# Patient Record
Sex: MALE | Race: WHITE | Employment: UNEMPLOYED | URBAN - METROPOLITAN AREA
[De-identification: names, ages, dates, MRNs, and addresses within clinical notes are randomized per-mention and may not be internally consistent; named-entity substitution may affect disease eponyms.]

---

## 2019-04-22 ENCOUNTER — APPOINTMENT (OUTPATIENT)
Dept: ULTRASOUND IMAGING | Age: 24
End: 2019-04-22

## 2019-04-22 ENCOUNTER — HOSPITAL ENCOUNTER (EMERGENCY)
Age: 24
Discharge: HOME | End: 2019-04-23
Attending: EMERGENCY MEDICINE

## 2019-04-22 VITALS
OXYGEN SATURATION: 96 % | BODY MASS INDEX: 26.6 KG/M2 | WEIGHT: 190 LBS | SYSTOLIC BLOOD PRESSURE: 142 MMHG | TEMPERATURE: 98.4 F | RESPIRATION RATE: 18 BRPM | HEIGHT: 71 IN | HEART RATE: 110 BPM | DIASTOLIC BLOOD PRESSURE: 96 MMHG

## 2019-04-22 DIAGNOSIS — L73.9 FOLLICULITIS: Primary | ICD-10-CM

## 2019-04-22 DIAGNOSIS — N50.812 LEFT TESTICULAR PAIN: ICD-10-CM

## 2019-04-22 DIAGNOSIS — K59.00 CONSTIPATION, UNSPECIFIED CONSTIPATION TYPE: ICD-10-CM

## 2019-04-22 DIAGNOSIS — R10.9 ABDOMINAL PAIN, UNSPECIFIED ABDOMINAL LOCATION: ICD-10-CM

## 2019-04-22 LAB
ALBUMIN SERPL-MCNC: 4.4 GM/DL (ref 3.4–5)
ALP BLD-CCNC: 49 IU/L (ref 40–128)
ALT SERPL-CCNC: 26 U/L (ref 10–40)
ANION GAP SERPL CALCULATED.3IONS-SCNC: 13 MMOL/L (ref 4–16)
AST SERPL-CCNC: 38 IU/L (ref 15–37)
BACTERIA: NEGATIVE /HPF
BASOPHILS ABSOLUTE: 0.1 K/CU MM
BASOPHILS RELATIVE PERCENT: 0.6 % (ref 0–1)
BILIRUB SERPL-MCNC: 0.9 MG/DL (ref 0–1)
BILIRUBIN URINE: NEGATIVE MG/DL
BLOOD, URINE: NEGATIVE
BUN BLDV-MCNC: 11 MG/DL (ref 6–23)
CALCIUM SERPL-MCNC: 9.5 MG/DL (ref 8.3–10.6)
CHLORIDE BLD-SCNC: 94 MMOL/L (ref 99–110)
CLARITY: CLEAR
CO2: 25 MMOL/L (ref 21–32)
COLOR: YELLOW
CREAT SERPL-MCNC: 1 MG/DL (ref 0.9–1.3)
DIFFERENTIAL TYPE: ABNORMAL
EOSINOPHILS ABSOLUTE: 0.2 K/CU MM
EOSINOPHILS RELATIVE PERCENT: 2.4 % (ref 0–3)
GFR AFRICAN AMERICAN: >60 ML/MIN/1.73M2
GFR NON-AFRICAN AMERICAN: >60 ML/MIN/1.73M2
GLUCOSE BLD-MCNC: 98 MG/DL (ref 70–99)
GLUCOSE, URINE: NEGATIVE MG/DL
HCT VFR BLD CALC: 46.3 % (ref 42–52)
HEMOGLOBIN: 15.4 GM/DL (ref 13.5–18)
IMMATURE NEUTROPHIL %: 0.4 % (ref 0–0.43)
KETONES, URINE: ABNORMAL MG/DL
LEUKOCYTE ESTERASE, URINE: NEGATIVE
LIPASE: 15 IU/L (ref 13–60)
LYMPHOCYTES ABSOLUTE: 2.3 K/CU MM
LYMPHOCYTES RELATIVE PERCENT: 29.9 % (ref 24–44)
MCH RBC QN AUTO: 30.6 PG (ref 27–31)
MCHC RBC AUTO-ENTMCNC: 33.3 % (ref 32–36)
MCV RBC AUTO: 91.9 FL (ref 78–100)
MONOCYTES ABSOLUTE: 1.1 K/CU MM
MONOCYTES RELATIVE PERCENT: 14.1 % (ref 0–4)
NITRITE URINE, QUANTITATIVE: NEGATIVE
NUCLEATED RBC %: 0 %
PDW BLD-RTO: 13.2 % (ref 11.7–14.9)
PH, URINE: 6 (ref 5–8)
PLATELET # BLD: 242 K/CU MM (ref 140–440)
PMV BLD AUTO: 9.5 FL (ref 7.5–11.1)
POTASSIUM SERPL-SCNC: 3.2 MMOL/L (ref 3.5–5.1)
PROTEIN UA: NEGATIVE MG/DL
RBC # BLD: 5.04 M/CU MM (ref 4.6–6.2)
RBC URINE: 1 /HPF (ref 0–3)
SEGMENTED NEUTROPHILS ABSOLUTE COUNT: 4.1 K/CU MM
SEGMENTED NEUTROPHILS RELATIVE PERCENT: 52.6 % (ref 36–66)
SODIUM BLD-SCNC: 132 MMOL/L (ref 135–145)
SPECIFIC GRAVITY UA: 1.01 (ref 1–1.03)
TOTAL IMMATURE NEUTOROPHIL: 0.03 K/CU MM
TOTAL NUCLEATED RBC: 0 K/CU MM
TOTAL PROTEIN: 7.2 GM/DL (ref 6.4–8.2)
TRICHOMONAS: ABNORMAL /HPF
UROBILINOGEN, URINE: NORMAL MG/DL (ref 0.2–1)
WBC # BLD: 7.8 K/CU MM (ref 4–10.5)
WBC UA: <1 /HPF (ref 0–2)

## 2019-04-22 PROCEDURE — 80053 COMPREHEN METABOLIC PANEL: CPT

## 2019-04-22 PROCEDURE — 80307 DRUG TEST PRSMV CHEM ANLYZR: CPT

## 2019-04-22 PROCEDURE — 81001 URINALYSIS AUTO W/SCOPE: CPT

## 2019-04-22 PROCEDURE — 93975 VASCULAR STUDY: CPT

## 2019-04-22 PROCEDURE — 87491 CHLMYD TRACH DNA AMP PROBE: CPT

## 2019-04-22 PROCEDURE — 87591 N.GONORRHOEAE DNA AMP PROB: CPT

## 2019-04-22 PROCEDURE — 76870 US EXAM SCROTUM: CPT

## 2019-04-22 PROCEDURE — 4500000027

## 2019-04-22 PROCEDURE — 85025 COMPLETE CBC W/AUTO DIFF WBC: CPT

## 2019-04-22 PROCEDURE — 36415 COLL VENOUS BLD VENIPUNCTURE: CPT

## 2019-04-22 PROCEDURE — 83690 ASSAY OF LIPASE: CPT

## 2019-04-22 PROCEDURE — 99284 EMERGENCY DEPT VISIT MOD MDM: CPT

## 2019-04-22 RX ORDER — FLUOXETINE HYDROCHLORIDE 20 MG/1
40 CAPSULE ORAL DAILY
COMMUNITY
End: 2020-05-31

## 2019-04-22 RX ORDER — HYDROXYZINE HYDROCHLORIDE 10 MG/1
10 TABLET, FILM COATED ORAL 3 TIMES DAILY PRN
COMMUNITY
End: 2020-05-31

## 2019-04-22 ASSESSMENT — PAIN DESCRIPTION - PAIN TYPE: TYPE: ACUTE PAIN

## 2019-04-22 ASSESSMENT — PAIN DESCRIPTION - DESCRIPTORS: DESCRIPTORS: SHARP

## 2019-04-22 ASSESSMENT — PAIN DESCRIPTION - LOCATION: LOCATION: ABDOMEN

## 2019-04-22 ASSESSMENT — PAIN SCALES - GENERAL: PAINLEVEL_OUTOF10: 9

## 2019-04-23 LAB
AMPHETAMINES: ABNORMAL
BARBITURATE SCREEN URINE: NEGATIVE
BENZODIAZEPINE SCREEN, URINE: NEGATIVE
CANNABINOID SCREEN URINE: ABNORMAL
COCAINE METABOLITE: NEGATIVE
OPIATES, URINE: NEGATIVE
OXYCODONE: ABNORMAL
PHENCYCLIDINE, URINE: NEGATIVE

## 2019-04-23 RX ORDER — DOXYCYCLINE HYCLATE 100 MG
100 TABLET ORAL 2 TIMES DAILY
Qty: 14 TABLET | Refills: 0 | Status: SHIPPED | OUTPATIENT
Start: 2019-04-23 | End: 2019-04-30

## 2019-04-23 NOTE — ED PROVIDER NOTES
eMERGENCY dEPARTMENT eNCOUnter      PCP: No primary care provider on file. CHIEF COMPLAINT    Chief Complaint   Patient presents with    Abdominal Pain       HPI    Nessa Bautista is a 24 y.o. male who presents with left sided scrotal pain. Onset was yesterday. Context is patient reports he drank a bottle of Robitussin 4 days ago to [employer] high\" but reports he was unable to urinate or have a bowel movement for many hours after drinking Robitussin. Patient reports he also tried meth for the first time 3 days ago and smoked marijuana. Patient reports he has been urinating today and had a BM earlier today. Patient reports his left side of scrotum has been having intermittent sharp pains. Pain does radiate throughout all of his abdomen and he reports his pain is sharp as well. No aggravating or alleviating factors. Patient also reports a rash in genital region. Patient reports that when he was having a bowel movement today he was \"bleeding from my balls. \"    Patient denies fever, chills, nausea, vomiting, diarrhea, melena, hematochezia, penile pain, penile discharge, right-sided testicular pain, concern for sexually transmitted infections, dysuria, urinary urgency, urinary frequency, hematuria. REVIEW OF SYSTEMS    Constitutional:  Denies fever, chills  HENT:  Denies sore throat or ear pain   Cardiovascular:  Denies chest pain, palpitations or swelling   Respiratory:  Denies cough or shortness of breath   GI:  See HPI above  : See HPI   Musculoskeletal:  Denies back pain or groin pain or masses. No pain or swelling of extremities. Skin:  + rash  Neurologic:  Denies headache, focal weakness or sensory changes   Endocrine:  Denies polyuria or polydypsia   Lymphatic:  Denies swollen glands     All other review of systems are negative  See HPI and nursing notes for additional information     [address]    History reviewed. No pertinent past medical history. History reviewed.  No pertinent surgical history. CURRENT MEDICATIONS    Current Outpatient Rx   Medication Sig Dispense Refill    doxycycline hyclate (VIBRA-TABS) 100 MG tablet Take 1 tablet by mouth 2 times daily for 7 days 14 tablet 0    FLUoxetine (PROZAC) 20 MG capsule Take 40 mg by mouth daily      hydrOXYzine (ATARAX) 10 MG tablet Take 10 mg by mouth 3 times daily as needed for Itching         ALLERGIES    Allergies no known allergies    SOCIAL AND FAMILY HISTORY    Social History     Socioeconomic History    Marital status: Single     Spouse name: None    Number of children: None    Years of education: None    Highest education level: None   Occupational History    None   Social Needs    Financial resource strain: None    Food insecurity:     Worry: None     Inability: None    Transportation needs:     Medical: None     Non-medical: None   Tobacco Use    Smoking status: Current Every Day Smoker     Packs/day: 0.50     Types: Cigarettes    Smokeless tobacco: Never Used   Substance and Sexual Activity    Alcohol use: Yes    Drug use: Yes     Types: Marijuana, Methamphetamines    Sexual activity: None   Lifestyle    Physical activity:     Days per week: None     Minutes per session: None    Stress: None   Relationships    Social connections:     Talks on phone: None     Gets together: None     Attends [employer] service: None     Active member of club or organization: None     Attends meetings of clubs or organizations: None     Relationship status: None    Intimate partner violence:     Fear of current or ex partner: None     Emotionally abused: None     Physically abused: None     Forced sexual activity: None   Other Topics Concern    None   Social History Narrative    None     History reviewed. No pertinent family history.     PHYSICAL EXAM    VITAL SIGNS: BP (!) 142/96   Pulse 110   Temp 98.4 °F (36.9 °C) (Oral)   Resp 18   Ht 5' 11\" (1.803 m)   Wt 190 lb (86.2 kg)   SpO2 96%   BMI 26.50 kg/m² Constitutional:  Well developed, well nourished. No distress  Eyes:  Sclera nonicteric, conjunctiva moist  HENT:  Atraumatic. PERRL. EOMI. Moist mucus membranes. Neck/Lymphatics: supple, no JVD, no swollen nodes  Respiratory:  No retractions, no accessory muscle use, normal breath sounds   Cardiovascular:  normal rate, normal rhythm, no murmurs    GI:    No gross discoloration.       -no [place]'s sign (periumbilical ecchymosis)       -no Grey-[place]'s sign (flank ecchymosis)    Bowel sounds present, no audible bruits. Soft, no distention, no guarding, no rigidity,   NO abdominal tenderness, no rebound tenderness, no palpable pulsatile masses,   No McBurney's point tenderness   Negative Rovsing sign    Negative Danielle's sign. Back:   No CVA tenderness to percussion. Musculoskeletal:  No edema, no deformity  Vascular: radial and DP pulses 2+ equal bilaterally  Integument: Dry skin. Genital region reveals mild erythema at base of hair follicles but no convalescent erythema. No streaking erythema. No induration or fluctuance. No bullae, vesicles, ecchymosis, abrasions, lacerations. There is no active bleeding present. Neurologic:  Alert & oriented, normal speech  Psychiatric: Cooperative, pleasant affect   GENITOURINARY: Male chaperone Steph Garcia RN present. Penile lesions are absent. There is no urethral discharge or bleeding. There is no penile erythema, edema, or deformity. There is no scrotal erythema, edema, masses, or function but there is some mild left-sided testicular tenderness to palpation, but no right-sided testicular tenderness to palpation. Inguinal hernias are absent. Perineal crepitus, ecchymoses, erythema, and masses are absent.        ______________________________________________________________________    Procedures:  Perirectal Exam  Male nurse, Steph Garcia, present.   Rectal exam was performed by me:  - External inspection reveals no external hemorrhoids, masses, signs of abscess, fissures.   ______________________________________________________________________      LABS:  Results for orders placed or performed during the hospital encounter of 04/22/19   CBC Auto Differential   Result Value Ref Range    WBC 7.8 4.0 - 10.5 K/CU MM    RBC 5.04 4.6 - 6.2 M/CU MM    Hemoglobin 15.4 13.5 - 18.0 GM/DL    Hematocrit 46.3 42 - 52 %    MCV 91.9 78 - 100 FL    MCH 30.6 27 - 31 PG    MCHC 33.3 32.0 - 36.0 %    RDW 13.2 11.7 - 14.9 %    Platelets 639 037 - 504 K/CU MM    MPV 9.5 7.5 - 11.1 FL    Differential Type AUTOMATED DIFFERENTIAL     Segs Relative 52.6 36 - 66 %    Lymphocytes % 29.9 24 - 44 %    Monocytes % 14.1 (H) 0 - 4 %    Eosinophils % 2.4 0 - 3 %    Basophils % 0.6 0 - 1 %    Segs Absolute 4.1 K/CU MM    Lymphocytes # 2.3 K/CU MM    Monocytes # 1.1 K/CU MM    Eosinophils # 0.2 K/CU MM    Basophils # 0.1 K/CU MM    Nucleated RBC % 0.0 %    Total Nucleated RBC 0.0 K/CU MM    Total Immature Neutrophil 0.03 K/CU MM    Immature Neutrophil % 0.4 0 - 0.43 %   Comprehensive Metabolic Panel   Result Value Ref Range    Sodium 132 (L) 135 - 145 MMOL/L    Potassium 3.2 (L) 3.5 - 5.1 MMOL/L    Chloride 94 (L) 99 - 110 mMol/L    CO2 25 21 - 32 MMOL/L    BUN 11 6 - 23 MG/DL    CREATININE 1.0 0.9 - 1.3 MG/DL    Glucose 98 70 - 99 MG/DL    Calcium 9.5 8.3 - 10.6 MG/DL    Alb 4.4 3.4 - 5.0 GM/DL    Total Protein 7.2 6.4 - 8.2 GM/DL    Total Bilirubin 0.9 0.0 - 1.0 MG/DL    ALT 26 10 - 40 U/L    AST 38 (H) 15 - 37 IU/L    Alkaline Phosphatase 49 40 - 128 IU/L    GFR Non-African American >60 >60 mL/min/1.73m2    GFR African American >60 >60 mL/min/1.73m2    Anion Gap 13 4 - 16   Lipase   Result Value Ref Range    Lipase 15 13 - 60 IU/L   Urinalysis   Result Value Ref Range    Color, UA YELLOW YELLOW    Clarity, UA CLEAR CLEAR    Glucose, Urine NEGATIVE NEGATIVE MG/DL    Bilirubin Urine NEGATIVE NEGATIVE MG/DL    Ketones, Urine MODERATE (A) NEGATIVE MG/DL    Specific Gravity, UA 1.008 1.001 - 1.035    Blood, Urine NEGATIVE NEGATIVE    pH, Urine 6.0 5.0 - 8.0    Protein, UA NEGATIVE NEGATIVE MG/DL    Urobilinogen, Urine NORMAL 0.2 - 1.0 MG/DL    Nitrite Urine, Quantitative NEGATIVE NEGATIVE    Leukocyte Esterase, Urine NEGATIVE NEGATIVE    RBC, UA 1 0 - 3 /HPF    WBC, UA <1 0 - 2 /HPF    Bacteria, UA NEGATIVE NEGATIVE /HPF    Trichomonas, UA NONE SEEN NONE SEEN /HPF   Urine Drug Screen   Result Value Ref Range    Cannabinoid Scrn, Ur UNCONFIRMED POSITIVE (A) NEGATIVE    Amphetamines UNCONFIRMED POSITIVE (A) NEGATIVE    Cocaine Metabolite NEGATIVE NEGATIVE    Benzodiazepine Screen, Urine NEGATIVE NEGATIVE    Barbiturate Screen, Ur NEGATIVE NEGATIVE    Opiates, Urine NEGATIVE NEGATIVE    Phencyclidine, Urine NEGATIVE NEGATIVE    Oxycodone  NEGATIVE     NEGATIVE          THRESHOLD CONCENTRATIONS (mg/dL)  AMPHT               1000  SIVAN,OPIA             300  BZO,BAR              200  PCP                   25  THC                   50  OXY                  100          IF POSITIVE, SPECIMEN WILL BE  DISCARDED AFTER 6 MONTHS. CALL LAB IF CONFIRMATION NEEDED. ALL NEGATIVE SPECIMENS WILL BE  DISCARDED AFTER ONE WEEK. * UNCONFIRMED POSITIVES MAY  NOT MEET FORENSIC REQUIREMENTS. RADIOLOGY/PROCEDURES    US SCROTUM AND TESTICLES   Final Result   Unremarkable testicular ultrasound with normal Doppler flow. US DUP ABD PEL RETRO SCROT COMPLETE   Final Result   Unremarkable testicular ultrasound with normal Doppler flow. ED COURSE & MEDICAL DECISION MAKING       Vital signs and nursing notes reviewed during ED course. Patient care and presentation staffed with and seen in conjunction with supervising physician, Dr. Trina Smith, today. Patient presents as above which prompted workup. While in the ED today, labs and imaging were obtained. Labs reveal unconfirmed positives for amphetamines and cannabinoids and urine drug screen but labs are otherwise without clinically significant derangements.  [address] of scrotum and testicles obtained today and is reassuring. No evidence of testicular torsion. Patient has been able to urinate several times in the ED. Suspect patient's initial constipation and urinary retention were secondary to Robitussin ingestion. Abdominal exam without peritoneal signs here. Physical exam remarkable for folliculitis of the genital region but no evidence of abscess present. Do not suspect Tin's gangrene. There is no evidence of an area that was bleeding despite patient reporting that he had bleeding earlier today. Patient is nontoxic appearing. Vital signs are stable- patient was tachycardia one point in the ED, but his heart rate was normal during my examination. Patient stable for outpatient management. Patient discharged home with prescription for doxycycline-we discussed medication. Advised patient to refrain from drug usage and we discussed drug abuse programs in the local area. Patient declines referral to drug abuse resources. All pertinent Lab data and radiographic results reviewed with patient at bedside. The patient and/or the family were informed of the results of any tests/labs/imaging, the treatment plan, and time was allotted to answer questions. Diagnosis, disposition, and treatment plan reviewed with patient and/or family who understands and agrees. Patient understands and agrees to follow up with walk in clinic for recheck in 2 days and PCP referral provided today. Patient understands and agrees to return to the emergency department for any new or worsening symptoms- strict return precautions given. Clinical  IMPRESSION    1. Folliculitis    2. Constipation, unspecified constipation type    3. Left testicular pain    4.  Abdominal pain, unspecified abdominal location        Disposition referral (if applicable):  [facility]  [address] [address] [facility]  [phone]  Call today  [address] in 2 days    April Valles Jacob Carlton MD  [address] [place] [address]  [phone]    Call today  Establish primary care physician    [facility] Emergency Department  [address] [facility] [address] [phone]  Go to   Return to ED if symptoms worsen or new symptoms      Disposition medications (if applicable):  Discharge Medication List as of 4/23/2019 12:24 AM      START taking these medications    Details   doxycycline hyclate (VIBRA-TABS) 100 MG tablet Take 1 tablet by mouth 2 times daily for 7 days, Disp-14 tablet, R-0Print               Comment: Please note this report has been produced using speech recognition software and may contain errors related to that system including errors in grammar, punctuation, and spelling, as well as words and phrases that may be inappropriate. If there are any questions or concerns please feel free to contact the dictating provider for clarification.        Zach Monique PA-C  04/24/19 6098

## 2019-04-23 NOTE — ED NOTES
Patient at [address].      Dasia Felton RN  04/22/19 3423

## 2019-04-23 NOTE — ED PROVIDER NOTES
I independently examined and evaluated Delfina Melendez. In brief, 23 yo M who presents with scrotal pain. Reportedly drank a bottle of robitussin Friday in an attempt to get high. Describes that he was unable to urinate or have BM initially. Last formed BM was earlier today. Has been able to urinate in ED several times. Focused exam revealed the patient appears well-hydrated, well-nourished. Mucous membranes are moist. Speech is clear. Breathing is unlabored. Abdomen is soft, nontender. Rectal exam is nontender. No hemorrhoids or areas of tenderness. No evidence of impaction. Patient does have evidence of folliculitis in the genital region. No areas of significant induration or fluctuance to suggest developing abscess. Skin is dry. Mental status is normal. The patient moves all extremities and is without facial droop. ED course: suspect initial constipation and urinary retention reported was secondary to Robitussin ingestion (depending on which formulation he ingested). His exam is unremarkable here. Labs and imaging also reassuring. Agree with plan for outpatient management. All diagnostic, treatment, and disposition decisions were made by myself in conjunction with the advanced practice provider. For all further details of the patient's emergency department visit, please see the advanced practice provider's documentation. Comment: Please note this report has been produced using speech recognition software and may contain errors related to that system including errors in grammar, punctuation, and spelling, as well as words and phrases that may be inappropriate. If there are any questions or concerns please feel free to contact the dictating provider for clarification.         Chela Celaya DO  04/23/19 0023

## 2019-04-23 NOTE — ED NOTES
Bladder scan: 311 ml. Notified Tatiana RAJPUT.  Patient attempting to urinate in urinal.     Ruiz Back, RN  04/22/19 7585

## 2019-04-23 NOTE — ED NOTES
Kathryn RAJPUT notified of patient's request for CT and updated on dysuria complaints. No further orders at this time. Awaiting urine results.      Francie Viramontes RN  04/22/19 9509

## 2019-04-23 NOTE — ED NOTES
Bed: H-03  Expected date:   Expected time:   Means of arrival:   Comments:  [facility]  04/22/19 7318

## 2019-04-25 LAB
CHLAMYDIA TRACHOMATIS AMPLIFIED DET: NEGATIVE
CHLAMYDIA TRACHOMATIS AMPLIFIED DET: NORMAL
N GONORRHOEAE AMPLIFIED DET: NEGATIVE
N GONORRHOEAE AMPLIFIED DET: NORMAL

## 2019-04-28 ENCOUNTER — HOSPITAL ENCOUNTER (EMERGENCY)
Age: 24
Discharge: HOME | End: 2019-04-29
Attending: EMERGENCY MEDICINE

## 2019-04-28 VITALS
OXYGEN SATURATION: 99 % | BODY MASS INDEX: 25.9 KG/M2 | HEART RATE: 101 BPM | HEIGHT: 71 IN | TEMPERATURE: 98.1 F | RESPIRATION RATE: 16 BRPM | WEIGHT: 185 LBS | SYSTOLIC BLOOD PRESSURE: 139 MMHG | DIASTOLIC BLOOD PRESSURE: 85 MMHG

## 2019-04-28 DIAGNOSIS — F19.10 POLYSUBSTANCE ABUSE (HCC): Primary | ICD-10-CM

## 2019-04-28 LAB
BASOPHILS ABSOLUTE: 0.1 K/CU MM
BASOPHILS RELATIVE PERCENT: 0.8 % (ref 0–1)
DIFFERENTIAL TYPE: ABNORMAL
EOSINOPHILS ABSOLUTE: 0.1 K/CU MM
EOSINOPHILS RELATIVE PERCENT: 1.1 % (ref 0–3)
HCT VFR BLD CALC: 45.4 % (ref 42–52)
HEMOGLOBIN: 15.5 GM/DL (ref 13.5–18)
IMMATURE NEUTROPHIL %: 0.5 % (ref 0–0.43)
LYMPHOCYTES ABSOLUTE: 2.5 K/CU MM
LYMPHOCYTES RELATIVE PERCENT: 24.8 % (ref 24–44)
MCH RBC QN AUTO: 30.5 PG (ref 27–31)
MCHC RBC AUTO-ENTMCNC: 34.1 % (ref 32–36)
MCV RBC AUTO: 89.2 FL (ref 78–100)
MONOCYTES ABSOLUTE: 1.1 K/CU MM
MONOCYTES RELATIVE PERCENT: 11.2 % (ref 0–4)
NUCLEATED RBC %: 0 %
PDW BLD-RTO: 12.5 % (ref 11.7–14.9)
PLATELET # BLD: 274 K/CU MM (ref 140–440)
PMV BLD AUTO: 9.5 FL (ref 7.5–11.1)
RBC # BLD: 5.09 M/CU MM (ref 4.6–6.2)
SEGMENTED NEUTROPHILS ABSOLUTE COUNT: 6.2 K/CU MM
SEGMENTED NEUTROPHILS RELATIVE PERCENT: 61.6 % (ref 36–66)
TOTAL IMMATURE NEUTOROPHIL: 0.05 K/CU MM
TOTAL NUCLEATED RBC: 0 K/CU MM
WBC # BLD: 10.1 K/CU MM (ref 4–10.5)

## 2019-04-28 PROCEDURE — 84443 ASSAY THYROID STIM HORMONE: CPT

## 2019-04-28 PROCEDURE — 80053 COMPREHEN METABOLIC PANEL: CPT

## 2019-04-28 PROCEDURE — G0480 DRUG TEST DEF 1-7 CLASSES: HCPCS

## 2019-04-28 PROCEDURE — 85025 COMPLETE CBC W/AUTO DIFF WBC: CPT

## 2019-04-28 PROCEDURE — 99284 EMERGENCY DEPT VISIT MOD MDM: CPT

## 2019-04-28 ASSESSMENT — PAIN DESCRIPTION - LOCATION: LOCATION: BACK;THROAT

## 2019-04-29 LAB
ACETAMINOPHEN LEVEL: <5 UG/ML (ref 15–30)
ALBUMIN SERPL-MCNC: 4.4 GM/DL (ref 3.4–5)
ALCOHOL SCREEN SERUM: NORMAL %WT/VOL
ALP BLD-CCNC: 49 IU/L (ref 40–128)
ALT SERPL-CCNC: 15 U/L (ref 10–40)
AMPHETAMINES: ABNORMAL
ANION GAP SERPL CALCULATED.3IONS-SCNC: 17 MMOL/L (ref 4–16)
AST SERPL-CCNC: 24 IU/L (ref 15–37)
BARBITURATE SCREEN URINE: NEGATIVE
BENZODIAZEPINE SCREEN, URINE: NEGATIVE
BILIRUB SERPL-MCNC: 0.7 MG/DL (ref 0–1)
BUN BLDV-MCNC: 9 MG/DL (ref 6–23)
CALCIUM SERPL-MCNC: 9.5 MG/DL (ref 8.3–10.6)
CANNABINOID SCREEN URINE: ABNORMAL
CHLORIDE BLD-SCNC: 93 MMOL/L (ref 99–110)
CO2: 24 MMOL/L (ref 21–32)
COCAINE METABOLITE: NEGATIVE
CREAT SERPL-MCNC: 0.9 MG/DL (ref 0.9–1.3)
DOSE AMOUNT: ABNORMAL
DOSE AMOUNT: ABNORMAL
DOSE TIME: ABNORMAL
DOSE TIME: ABNORMAL
GFR AFRICAN AMERICAN: >60 ML/MIN/1.73M2
GFR NON-AFRICAN AMERICAN: >60 ML/MIN/1.73M2
GLUCOSE BLD-MCNC: 87 MG/DL (ref 70–99)
OPIATES, URINE: NEGATIVE
OXYCODONE: ABNORMAL
PHENCYCLIDINE, URINE: NEGATIVE
POTASSIUM SERPL-SCNC: 3.8 MMOL/L (ref 3.5–5.1)
SALICYLATE LEVEL: <0.3 MG/DL (ref 15–30)
SODIUM BLD-SCNC: 134 MMOL/L (ref 135–145)
TOTAL PROTEIN: 7.1 GM/DL (ref 6.4–8.2)
TSH HIGH SENSITIVITY: 1.43 UIU/ML (ref 0.27–4.2)

## 2019-04-29 PROCEDURE — 80307 DRUG TEST PRSMV CHEM ANLYZR: CPT

## 2019-04-29 NOTE — ED NOTES
Pt ambulated to bathroom to provide urine sample at this time. Pt. Returned to bed and covered with blanket. Pt. Resting soundly.       Kinjal Santillan RN  04/29/19 8209

## 2019-04-29 NOTE — ED NOTES
Pt. Calm and cooperative at this time with sitter at bedside. Pt. Eyes closed, chest rise and fall present.       Reta Calero RN  04/29/19 8365

## 2019-04-29 NOTE — ED PROVIDER NOTES
eMERGENCY dEPARTMENT eNCOUnter      PCP: No primary care provider on file. CHIEF COMPLAINT    Chief Complaint   Patient presents with    Alcohol Intoxication    Addiction Problem    Suicidal       HPI    Yessenia Daly is a 24 y.o. male who presents for suicidal ideation as well as coming down from excessive drug use. Patient reports use cocaine and methamphetamine today. Reports that his been around a week since he has done any alcohol. He does report a couple days ago he did snort some heroin. Denies any intravenous drug use. Patient was staying with a friend until he had a falling out a couple days ago. Currently homeless. He reports that he has been doing meth constantly and has not slept much in the last 5 days. He denies homicidal ideations but does report that his been feeling like he does not want to exist anymore from a week to week and a half. He reports these are about shooting himself as well as jumping off a james. He denies acting on any suicidal thoughts in the past.  States that he used to be on Prozac but this been off of this for some time. REVIEW OF SYSTEMS    Psychiatric: See HPI. No Auditory/visual hallucinations  General: No fevers or chills  Cardiac:  No chest pain or palpitations  Respiratory: No difficulty breathing or new cough  Neurologic: No Headache or syncope  GI: No vomiting or diarrhea  : No dysuria or hematuria  See HPI for further details. All other systems reviewed and are negative. PAST MEDICAL & SURGICALHISTORY    No past medical history on file. No past surgical history on file.     CURRENT MEDICATIONS    Current Outpatient Rx   Medication Sig Dispense Refill    doxycycline hyclate (VIBRA-TABS) 100 MG tablet Take 1 tablet by mouth 2 times daily for 7 days 14 tablet 0    FLUoxetine (PROZAC) 20 MG capsule Take 40 mg by mouth daily      hydrOXYzine (ATARAX) 10 MG tablet Take 10 mg by mouth 3 times daily as needed for Itching         ALLERGIES    No Known Allergies    SOCIAL & FAMILYHISTORY    Social History     Socioeconomic History    Marital status: Single     Spouse name: Not on file    Number of children: Not on file    Years of education: Not on file    Highest education level: Not on file   Occupational History    Not on file   Social Needs    Financial resource strain: Not on file    Food insecurity:     Worry: Not on file     Inability: Not on file    Transportation needs:     Medical: Not on file     Non-medical: Not on file   Tobacco Use    Smoking status: Current Every Day Smoker     Packs/day: 0.50     Types: Cigarettes    Smokeless tobacco: Never Used   Substance and Sexual Activity    Alcohol use: Yes    Drug use: Yes     Types: Marijuana, Methamphetamines    Sexual activity: Not on file   Lifestyle    Physical activity:     Days per week: Not on file     Minutes per session: Not on file    Stress: Not on file   Relationships    Social connections:     Talks on phone: Not on file     Gets together: Not on file     Attends [employer] service: Not on file     Active member of club or organization: Not on file     Attends meetings of clubs or organizations: Not on file     Relationship status: Not on file    Intimate partner violence:     Fear of current or ex partner: Not on file     Emotionally abused: Not on file     Physically abused: Not on file     Forced sexual activity: Not on file   Other Topics Concern    Not on file   Social History Narrative    Not on file     No family history on file. PHYSICAL EXAM    VITAL SIGNS: /85   Pulse 101   Temp 98.1 °F (36.7 °C) (Oral)   Resp 16   Ht 5' 11\" (1.803 m)   Wt 185 lb (83.9 kg)   SpO2 99%   BMI 25.80 kg/m²   Constitutional:  Well developed, well nourished, no acute distress  Eyes:  EOMI. PERRL, conjunctiva normal   HENT:  Atraumatic, external ears normal, nose normal   Neck/Lymphatics: supple, no JVD, no swollen nodes.   No thyromegaly or thyroid nodules.   Respiratory:  No respiratory distress, normal breath sounds  Cardiovascular:  Normal rate, normal rhythm, no murmurs  GI:  Soft, nondistended, normal bowel sounds, nontender  Musculoskeletal:  No edema, no acute deformities   Integument:  Well hydrated   Neurologic:  Awake alert and oriented, no slurred speech, normal gross motor coordination and strength   Psychiatric:  Anxious, detached, calm, cooperative. + vague SI      LABS:  Results for orders placed or performed during the hospital encounter of 04/28/19   CBC w/ auto diff   Result Value Ref Range    WBC 10.1 4.0 - 10.5 K/CU MM    RBC 5.09 4.6 - 6.2 M/CU MM    Hemoglobin 15.5 13.5 - 18.0 GM/DL    Hematocrit 45.4 42 - 52 %    MCV 89.2 78 - 100 FL    MCH 30.5 27 - 31 PG    MCHC 34.1 32.0 - 36.0 %    RDW 12.5 11.7 - 14.9 %    Platelets 201 864 - 688 K/CU MM    MPV 9.5 7.5 - 11.1 FL    Differential Type AUTOMATED DIFFERENTIAL     Segs Relative 61.6 36 - 66 %    Lymphocytes % 24.8 24 - 44 %    Monocytes % 11.2 (H) 0 - 4 %    Eosinophils % 1.1 0 - 3 %    Basophils % 0.8 0 - 1 %    Segs Absolute 6.2 K/CU MM    Lymphocytes # 2.5 K/CU MM    Monocytes # 1.1 K/CU MM    Eosinophils # 0.1 K/CU MM    Basophils # 0.1 K/CU MM    Nucleated RBC % 0.0 %    Total Nucleated RBC 0.0 K/CU MM    Total Immature Neutrophil 0.05 K/CU MM    Immature Neutrophil % 0.5 (H) 0 - 0.43 %   CMP   Result Value Ref Range    Sodium 134 (L) 135 - 145 MMOL/L    Potassium 3.8 3.5 - 5.1 MMOL/L    Chloride 93 (L) 99 - 110 mMol/L    CO2 24 21 - 32 MMOL/L    BUN 9 6 - 23 MG/DL    CREATININE 0.9 0.9 - 1.3 MG/DL    Glucose 87 70 - 99 MG/DL    Calcium 9.5 8.3 - 10.6 MG/DL    Alb 4.4 3.4 - 5.0 GM/DL    Total Protein 7.1 6.4 - 8.2 GM/DL    Total Bilirubin 0.7 0.0 - 1.0 MG/DL    ALT 15 10 - 40 U/L    AST 24 15 - 37 IU/L    Alkaline Phosphatase 49 40 - 128 IU/L    GFR Non-African American >60 >60 mL/min/1.73m2    GFR African American >60 >60 mL/min/1.73m2    Anion Gap 17 (H) 4 - 16   ETOH Blood during ED course. Patient care and presentation staffed with supervising MD.   Patient seen by supervising MD today- see his/her note for details of the encounter. Consultation: Mental health Professional consulted in the emergency Department. See [facility] note for details of MH evaluation. Patient recheck reveals patient lying comfortable in exam bed. Patient denies suicidal thoughts or self-harm thoughts at this time. Patient understands the plan of  Patient understands he/she may return to ED at any time thoughts recur or any other general concerns. Differential diagnoses: Drug or alcohol use or abuse, psychosis, behavioral mental illness, metabolic disturbance, infection, neurologic emergency, other    Clinical  IMPRESSION    1. Polysubstance abuse ([facility] [address]       Diagnosis and plan discussed in detail with patient who understands and agrees. Return to emergency Department precautions were discussed in detail with patient, including worsening or any new symptoms, who understands and agre    Comment: Please note this report has been produced using speech recognition software and may contain errors related to that system including errors in grammar, punctuation, and spelling, as well as words and phrases that may be inappropriate. If there are any questions or concerns please feel free to contact the dictating provider for clarification.       Tamar Negron PA-C  04/29/19 8779

## 2019-04-29 NOTE — ED NOTES
Report received from [facility] 9 pt care assumed at this time.      Josh Stevenson, ENOCH  04/29/19 2156

## 2019-04-29 NOTE — ED NOTES
Patient resting in bed with sitter at bedside. Patient appears to be sleep however arouses easily. Patient appears to be under the influence of a unknown substance. Patient's eyes are glassy with hesitant speech. Patient admits to using meth earlier in the day. Patient reports he has a problem with using drugs. Patient's voice is slurred. Patient reports he is tired of using and wants help. Patient states he does not want to harm self or others. Patient denies alcohol use. Patient does admit to having thoughts of suicide after using drugs. Patient however states he is not suicidal and just wants some rest. Patient states \"I am tired\". Patient does not share any other information. Patient states he would go to [facility] for help for his  substance abuse. Patient then noted to close eyes and appear to be sleep. Patient was pleasant and mood was calm/sleepy. Patient denied homicidal ideation and did not answer question in reference to hallucinations, just shrugged shoulders. Patient would benefit from substance abuse treatment, has denied SI/HI. Will share with ED Physician for appropriate disposition.       Angelina Garvin, RN  04/29/19 [address] [facility],[address] [facility], RN  04/29/19 5824

## 2019-04-29 NOTE — ED NOTES
Pt resting quietly on bed with eyes closed, breathing is deep and even, no s/sx of pain or distress noted. 1:1 sitter with q15 min safety checks in place per protocol, will continue to monitor.           Mauro Love RN  04/29/19 4249

## 2019-04-29 NOTE — ED TRIAGE NOTES
States he is withdrawing from alcohol and meth, crack cocaine and pot. Patient states he has exhausted all of his support. Patient states he wishes he didn't wake up and has had thoughts of wanting to kill himself. Patient is anxious.

## 2019-04-29 NOTE — ED PROVIDER NOTES
Emergency [address] EMERGENCY DEPARTMENT    Patient: Rama Loving  MRN: [id number]  : 1995  Date of Evaluation: 2019  ED Supervising Physician: Giovani Lou MD    I independently examined and evaluated Rama Loving. In brief, Rama Loving is a 24 y.o. male that presents to the emergency department for help with his polysubstance abuse that includes meth, crack, heroin, marijuana and alcohol. Also states that he has had some suicidal thoughts but does not think that he would go through with anything. Focused exam: Well-appearing patient in no acute distress. Cardiac exam reveals normal heart rate and rhythm without any murmurs. Lungs sounds are clear bilaterally with no increased work of breathing. Abdomen is soft, nontender, nondistended. Depressed mood. Flat affect. Brief ED course/MDM: Patient presents with polysubstance abuse, suicidal thoughts. Mental health consultant for further evaluation. No acute medical issue at this time. After evaluation, felt that this is mostly a substance abuse issue and the patient is not an immediate harm to self and can seek care at [facility]. Patient agreeable with this plan of care and discharged in good condition. All diagnostic, treatment, and disposition decisions were made by myself in conjunction with the [facility]. For all further details of the patient's emergency department visit, please see their documentation.     (Please note that portions of this note may have been completed with a voice recognition program. Efforts were made to edit the dictations but occasionally words are mis-transcribed.)    MD Indio Damon MD  [phone]

## 2019-04-29 NOTE — ED NOTES
Pt asleep at this time laying on side. No pt. Needs or concerns.       Edouard Talbot RN  04/29/19 0751

## 2019-04-29 NOTE — ED NOTES
Bed: ED-23  Expected date:   Expected time:   Means of arrival:   Comments:  Tam bed zone 2     Tatyana Anderson RN  04/28/19 7685

## 2019-05-03 ENCOUNTER — HOSPITAL ENCOUNTER (OUTPATIENT)
Age: 24
Discharge: HOME | End: 2019-05-03
Payer: MEDICAID

## 2019-05-03 LAB
ALBUMIN SERPL-MCNC: 3.8 GM/DL (ref 3.4–5)
ALP BLD-CCNC: 45 IU/L (ref 40–129)
ALT SERPL-CCNC: 14 U/L (ref 10–40)
AST SERPL-CCNC: 15 IU/L (ref 15–37)
BILIRUB SERPL-MCNC: 0.2 MG/DL (ref 0–1)
BILIRUBIN DIRECT: 0.2 MG/DL (ref 0–0.3)
BILIRUBIN, INDIRECT: 0 MG/DL (ref 0–0.7)
HEPATITIS B SURFACE ANTIGEN: NON REACTIVE
HEPATITIS C ANTIBODY: NON REACTIVE
TOTAL PROTEIN: 5.9 GM/DL (ref 6.4–8.2)

## 2019-05-03 PROCEDURE — 86708 HEPATITIS A ANTIBODY: CPT

## 2019-05-03 PROCEDURE — 86803 HEPATITIS C AB TEST: CPT

## 2019-05-03 PROCEDURE — 87340 HEPATITIS B SURFACE AG IA: CPT

## 2019-05-03 PROCEDURE — 80076 HEPATIC FUNCTION PANEL: CPT

## 2019-05-03 PROCEDURE — 36415 COLL VENOUS BLD VENIPUNCTURE: CPT

## 2019-05-03 PROCEDURE — 87389 HIV-1 AG W/HIV-1&-2 AB AG IA: CPT

## 2019-05-04 LAB
HAV AB SERPL IA-ACNC: ABNORMAL
HAV AB SERPL IA-ACNC: POSITIVE

## 2019-05-05 LAB — HIV SCREEN: NON REACTIVE

## 2019-10-17 ENCOUNTER — HOSPITAL ENCOUNTER (EMERGENCY)
Age: 24
Discharge: HOME | End: 2019-10-17
Payer: MEDICAID

## 2019-10-17 VITALS
RESPIRATION RATE: 16 BRPM | WEIGHT: 180 LBS | HEIGHT: 71 IN | HEART RATE: 85 BPM | SYSTOLIC BLOOD PRESSURE: 162 MMHG | OXYGEN SATURATION: 98 % | DIASTOLIC BLOOD PRESSURE: 82 MMHG | BODY MASS INDEX: 25.2 KG/M2 | TEMPERATURE: 98.4 F

## 2019-10-17 DIAGNOSIS — K08.89 PAIN, DENTAL: Primary | ICD-10-CM

## 2019-10-17 PROCEDURE — 99282 EMERGENCY DEPT VISIT SF MDM: CPT

## 2019-10-17 RX ORDER — LIDOCAINE HYDROCHLORIDE 20 MG/ML
SOLUTION OROPHARYNGEAL
Qty: 120 ML | Refills: 0 | Status: SHIPPED | OUTPATIENT
Start: 2019-10-17 | End: 2020-05-31

## 2019-10-17 RX ORDER — IBUPROFEN 600 MG/1
600 TABLET ORAL EVERY 6 HOURS PRN
Qty: 30 TABLET | Refills: 0 | Status: SHIPPED | OUTPATIENT
Start: 2019-10-17 | End: 2019-11-19 | Stop reason: SDUPTHER

## 2019-10-17 RX ORDER — TRAMADOL HYDROCHLORIDE 50 MG/1
50 TABLET ORAL EVERY 6 HOURS PRN
Qty: 15 TABLET | Refills: 0 | Status: SHIPPED | OUTPATIENT
Start: 2019-10-17 | End: 2019-10-17 | Stop reason: CLARIF

## 2019-10-17 ASSESSMENT — PAIN DESCRIPTION - LOCATION: LOCATION: TEETH;JAW

## 2019-10-17 ASSESSMENT — PAIN DESCRIPTION - PAIN TYPE: TYPE: ACUTE PAIN

## 2019-10-17 ASSESSMENT — PAIN SCALES - GENERAL: PAINLEVEL_OUTOF10: 9

## 2019-10-17 ASSESSMENT — PAIN DESCRIPTION - ORIENTATION: ORIENTATION: UPPER;ANTERIOR

## 2019-11-09 ENCOUNTER — HOSPITAL ENCOUNTER (EMERGENCY)
Age: 24
Discharge: PSYCHIATRIC OR SUBSTANCE USE FACILITY | End: 2019-11-10
Attending: EMERGENCY MEDICINE
Payer: COMMERCIAL

## 2019-11-09 DIAGNOSIS — R45.851 SUICIDAL IDEATION: Primary | ICD-10-CM

## 2019-11-09 LAB
ACETAMINOPHEN LEVEL: <5 UG/ML (ref 15–30)
ALBUMIN SERPL-MCNC: 4.7 GM/DL (ref 3.4–5)
ALCOHOL SCREEN SERUM: 0.08 %WT/VOL
ALCOHOL SCREEN SERUM: 0.18 %WT/VOL
ALP BLD-CCNC: 54 IU/L (ref 40–128)
ALT SERPL-CCNC: 26 U/L (ref 10–40)
AMPHETAMINES: NEGATIVE
ANION GAP SERPL CALCULATED.3IONS-SCNC: 19 MMOL/L (ref 4–16)
AST SERPL-CCNC: 34 IU/L (ref 15–37)
BARBITURATE SCREEN URINE: NEGATIVE
BENZODIAZEPINE SCREEN, URINE: NEGATIVE
BILIRUB SERPL-MCNC: 0.3 MG/DL (ref 0–1)
BUN BLDV-MCNC: 11 MG/DL (ref 6–23)
CALCIUM SERPL-MCNC: 9 MG/DL (ref 8.3–10.6)
CANNABINOID SCREEN URINE: NEGATIVE
CHLORIDE BLD-SCNC: 98 MMOL/L (ref 99–110)
CO2: 23 MMOL/L (ref 21–32)
COCAINE METABOLITE: NEGATIVE
CREAT SERPL-MCNC: 1 MG/DL (ref 0.9–1.3)
DOSE AMOUNT: ABNORMAL
DOSE TIME: ABNORMAL
GFR AFRICAN AMERICAN: >60 ML/MIN/1.73M2
GFR NON-AFRICAN AMERICAN: >60 ML/MIN/1.73M2
GLUCOSE BLD-MCNC: 56 MG/DL (ref 70–99)
HCT VFR BLD CALC: 48.7 % (ref 42–52)
HEMOGLOBIN: 16.3 GM/DL (ref 13.5–18)
MCH RBC QN AUTO: 31.3 PG (ref 27–31)
MCHC RBC AUTO-ENTMCNC: 33.5 % (ref 32–36)
MCV RBC AUTO: 93.5 FL (ref 78–100)
OPIATES, URINE: NEGATIVE
OXYCODONE: NORMAL
PDW BLD-RTO: 12.2 % (ref 11.7–14.9)
PHENCYCLIDINE, URINE: NEGATIVE
PLATELET # BLD: 244 K/CU MM (ref 140–440)
PMV BLD AUTO: 9.5 FL (ref 7.5–11.1)
POTASSIUM SERPL-SCNC: 4.4 MMOL/L (ref 3.5–5.1)
RBC # BLD: 5.21 M/CU MM (ref 4.6–6.2)
SODIUM BLD-SCNC: 140 MMOL/L (ref 135–145)
TOTAL PROTEIN: 7.3 GM/DL (ref 6.4–8.2)
WBC # BLD: 17.7 K/CU MM (ref 4–10.5)

## 2019-11-09 PROCEDURE — 36415 COLL VENOUS BLD VENIPUNCTURE: CPT

## 2019-11-09 PROCEDURE — 99285 EMERGENCY DEPT VISIT HI MDM: CPT

## 2019-11-09 PROCEDURE — G0480 DRUG TEST DEF 1-7 CLASSES: HCPCS

## 2019-11-09 PROCEDURE — 80053 COMPREHEN METABOLIC PANEL: CPT

## 2019-11-09 PROCEDURE — 85027 COMPLETE CBC AUTOMATED: CPT

## 2019-11-09 PROCEDURE — 6370000000 HC RX 637 (ALT 250 FOR IP): Performed by: EMERGENCY MEDICINE

## 2019-11-09 PROCEDURE — 80307 DRUG TEST PRSMV CHEM ANLYZR: CPT

## 2019-11-09 RX ORDER — ONDANSETRON 4 MG/1
4 TABLET, ORALLY DISINTEGRATING ORAL ONCE
Status: COMPLETED | OUTPATIENT
Start: 2019-11-09 | End: 2019-11-09

## 2019-11-09 RX ADMIN — ONDANSETRON 4 MG: 4 TABLET, ORALLY DISINTEGRATING ORAL at 18:38

## 2019-11-10 VITALS
BODY MASS INDEX: 25.2 KG/M2 | TEMPERATURE: 98.1 F | SYSTOLIC BLOOD PRESSURE: 109 MMHG | OXYGEN SATURATION: 98 % | WEIGHT: 180 LBS | HEIGHT: 71 IN | RESPIRATION RATE: 16 BRPM | DIASTOLIC BLOOD PRESSURE: 55 MMHG | HEART RATE: 88 BPM

## 2019-11-10 PROCEDURE — 6370000000 HC RX 637 (ALT 250 FOR IP): Performed by: EMERGENCY MEDICINE

## 2019-11-10 RX ORDER — IBUPROFEN 600 MG/1
600 TABLET ORAL ONCE
Status: COMPLETED | OUTPATIENT
Start: 2019-11-10 | End: 2019-11-10

## 2019-11-10 RX ADMIN — IBUPROFEN 600 MG: 600 TABLET, FILM COATED ORAL at 06:20

## 2019-11-10 ASSESSMENT — PAIN DESCRIPTION - DESCRIPTORS: DESCRIPTORS: DISCOMFORT

## 2019-11-10 ASSESSMENT — PAIN DESCRIPTION - FREQUENCY: FREQUENCY: CONTINUOUS

## 2019-11-10 ASSESSMENT — PAIN SCALES - GENERAL
PAINLEVEL_OUTOF10: 8
PAINLEVEL_OUTOF10: 5

## 2019-11-10 ASSESSMENT — PAIN DESCRIPTION - LOCATION: LOCATION: THROAT

## 2019-11-10 ASSESSMENT — PAIN DESCRIPTION - PAIN TYPE: TYPE: ACUTE PAIN

## 2019-11-18 VITALS
WEIGHT: 180 LBS | BODY MASS INDEX: 25.2 KG/M2 | RESPIRATION RATE: 18 BRPM | OXYGEN SATURATION: 97 % | HEART RATE: 98 BPM | DIASTOLIC BLOOD PRESSURE: 92 MMHG | TEMPERATURE: 98.4 F | SYSTOLIC BLOOD PRESSURE: 123 MMHG | HEIGHT: 71 IN

## 2019-11-18 ASSESSMENT — PAIN SCALES - GENERAL: PAINLEVEL_OUTOF10: 6

## 2019-11-18 ASSESSMENT — PAIN DESCRIPTION - ORIENTATION: ORIENTATION: MID;LOWER

## 2019-11-18 ASSESSMENT — PAIN DESCRIPTION - LOCATION: LOCATION: BACK

## 2019-11-18 ASSESSMENT — PAIN DESCRIPTION - PAIN TYPE: TYPE: ACUTE PAIN

## 2019-11-18 ASSESSMENT — PAIN DESCRIPTION - DESCRIPTORS: DESCRIPTORS: THROBBING

## 2019-11-19 ENCOUNTER — HOSPITAL ENCOUNTER (EMERGENCY)
Age: 24
Discharge: HOME | End: 2019-11-19
Attending: EMERGENCY MEDICINE
Payer: COMMERCIAL

## 2019-11-19 DIAGNOSIS — S39.012A STRAIN OF LUMBAR REGION, INITIAL ENCOUNTER: Primary | ICD-10-CM

## 2019-11-19 PROCEDURE — 99283 EMERGENCY DEPT VISIT LOW MDM: CPT

## 2019-11-19 PROCEDURE — 6370000000 HC RX 637 (ALT 250 FOR IP): Performed by: EMERGENCY MEDICINE

## 2019-11-19 RX ORDER — LIDOCAINE 4 G/G
1 PATCH TOPICAL ONCE
Status: DISCONTINUED | OUTPATIENT
Start: 2019-11-19 | End: 2019-11-19 | Stop reason: HOSPADM

## 2019-11-19 RX ORDER — IBUPROFEN 600 MG/1
600 TABLET ORAL ONCE
Status: COMPLETED | OUTPATIENT
Start: 2019-11-19 | End: 2019-11-19

## 2019-11-19 RX ORDER — ACETAMINOPHEN 500 MG
1000 TABLET ORAL EVERY 6 HOURS PRN
Qty: 30 TABLET | Refills: 1 | Status: SHIPPED | OUTPATIENT
Start: 2019-11-19 | End: 2020-05-31

## 2019-11-19 RX ORDER — ACETAMINOPHEN 500 MG
1000 TABLET ORAL ONCE
Status: COMPLETED | OUTPATIENT
Start: 2019-11-19 | End: 2019-11-19

## 2019-11-19 RX ORDER — LIDOCAINE 50 MG/G
1 PATCH TOPICAL DAILY
Qty: 10 PATCH | Refills: 0 | Status: SHIPPED | OUTPATIENT
Start: 2019-11-19 | End: 2019-11-29

## 2019-11-19 RX ORDER — IBUPROFEN 600 MG/1
600 TABLET ORAL EVERY 6 HOURS PRN
Qty: 30 TABLET | Refills: 0 | Status: SHIPPED | OUTPATIENT
Start: 2019-11-19 | End: 2020-02-20 | Stop reason: ALTCHOICE

## 2019-11-19 RX ADMIN — ACETAMINOPHEN 1000 MG: 500 TABLET ORAL at 01:12

## 2019-11-19 RX ADMIN — IBUPROFEN 600 MG: 600 TABLET, FILM COATED ORAL at 01:12

## 2019-11-21 ASSESSMENT — ENCOUNTER SYMPTOMS
ABDOMINAL PAIN: 0
BACK PAIN: 1
COLOR CHANGE: 0
SHORTNESS OF BREATH: 0

## 2020-01-11 ENCOUNTER — HOSPITAL ENCOUNTER (EMERGENCY)
Age: 25
Discharge: HOME | End: 2020-01-11
Attending: EMERGENCY MEDICINE
Payer: COMMERCIAL

## 2020-01-11 VITALS
HEART RATE: 75 BPM | HEIGHT: 71 IN | OXYGEN SATURATION: 100 % | WEIGHT: 190 LBS | BODY MASS INDEX: 26.6 KG/M2 | RESPIRATION RATE: 16 BRPM | TEMPERATURE: 97.9 F | DIASTOLIC BLOOD PRESSURE: 75 MMHG | SYSTOLIC BLOOD PRESSURE: 129 MMHG

## 2020-01-11 LAB
ACETAMINOPHEN LEVEL: <5 UG/ML (ref 15–30)
ALBUMIN SERPL-MCNC: 4.3 GM/DL (ref 3.4–5)
ALCOHOL SCREEN SERUM: NORMAL %WT/VOL
ALP BLD-CCNC: 44 IU/L (ref 40–129)
ALT SERPL-CCNC: 31 U/L (ref 10–40)
AMPHETAMINES: ABNORMAL
ANION GAP SERPL CALCULATED.3IONS-SCNC: 12 MMOL/L (ref 4–16)
AST SERPL-CCNC: 20 IU/L (ref 15–37)
BARBITURATE SCREEN URINE: NEGATIVE
BENZODIAZEPINE SCREEN, URINE: NEGATIVE
BILIRUB SERPL-MCNC: 0.5 MG/DL (ref 0–1)
BUN BLDV-MCNC: 15 MG/DL (ref 6–23)
CALCIUM SERPL-MCNC: 9.1 MG/DL (ref 8.3–10.6)
CANNABINOID SCREEN URINE: ABNORMAL
CHLORIDE BLD-SCNC: 96 MMOL/L (ref 99–110)
CO2: 27 MMOL/L (ref 21–32)
COCAINE METABOLITE: NEGATIVE
CREAT SERPL-MCNC: 1 MG/DL (ref 0.9–1.3)
DOSE AMOUNT: ABNORMAL
DOSE AMOUNT: ABNORMAL
DOSE TIME: ABNORMAL
DOSE TIME: ABNORMAL
GFR AFRICAN AMERICAN: >60 ML/MIN/1.73M2
GFR NON-AFRICAN AMERICAN: >60 ML/MIN/1.73M2
GLUCOSE BLD-MCNC: 93 MG/DL (ref 70–99)
HCG QUALITATIVE: NEGATIVE
HCT VFR BLD CALC: 44.4 % (ref 42–52)
HEMOGLOBIN: 14.9 GM/DL (ref 13.5–18)
MCH RBC QN AUTO: 30.5 PG (ref 27–31)
MCHC RBC AUTO-ENTMCNC: 33.6 % (ref 32–36)
MCV RBC AUTO: 91 FL (ref 78–100)
OPIATES, URINE: NEGATIVE
OXYCODONE: ABNORMAL
PDW BLD-RTO: 11.9 % (ref 11.7–14.9)
PHENCYCLIDINE, URINE: NEGATIVE
PLATELET # BLD: 213 K/CU MM (ref 140–440)
PMV BLD AUTO: 9.3 FL (ref 7.5–11.1)
POTASSIUM SERPL-SCNC: 3.7 MMOL/L (ref 3.5–5.1)
RBC # BLD: 4.88 M/CU MM (ref 4.6–6.2)
SALICYLATE LEVEL: <0.3 MG/DL (ref 15–30)
SODIUM BLD-SCNC: 135 MMOL/L (ref 135–145)
TOTAL PROTEIN: 6.8 GM/DL (ref 6.4–8.2)
WBC # BLD: 7 K/CU MM (ref 4–10.5)

## 2020-01-11 PROCEDURE — 85027 COMPLETE CBC AUTOMATED: CPT

## 2020-01-11 PROCEDURE — G0480 DRUG TEST DEF 1-7 CLASSES: HCPCS

## 2020-01-11 PROCEDURE — 84703 CHORIONIC GONADOTROPIN ASSAY: CPT

## 2020-01-11 PROCEDURE — 80053 COMPREHEN METABOLIC PANEL: CPT

## 2020-01-11 PROCEDURE — 99284 EMERGENCY DEPT VISIT MOD MDM: CPT

## 2020-01-11 PROCEDURE — 80307 DRUG TEST PRSMV CHEM ANLYZR: CPT

## 2020-01-11 NOTE — ED PROVIDER NOTES
Triage Chief Complaint:   Depression      [place]:  Celine Miranda is a 21 y.o. male that presents with depression and suicidal thoughts. He denies any plans of harming himself. He states that he stopped taking his Prozac about a week ago and thinks his symptoms have been worse since that time. He also states that he does not think the meth use is helping. He last used meth yesterday. He also states that he drinks alcohol and smokes tobacco and marijuana though does not do all of these things daily unless it is available. He denies any chest pain, shortness breath, nausea or vomiting. He has a chronic ache in his abdomen at the site of a previous hernia which is unchanged. He still having bowel movements. No fevers. ROS:  General:  No fevers  Eyes:  No recent vison changes  ENT:  No sore throat, no nasal congestion  Cardiovascular:  No chest pain, no palpitations  Respiratory:  No shortness of breath  Gastrointestinal:  No pain, no nausea, no vomiting, no diarrhea, chronic abdominal pain  Musculoskeletal:  No muscle pain  Skin:  No rash  Neurologic:  no headache  Psychiatric:  No anxiety, + depression, +suicidal ideation  Genitourinary:  No dysuria  Endocrine:  No unexpected weight gain, no unexpected weight loss  Extremities:  no edema, no pain    Past Medical History:   Diagnosis Date    Depression      Past Surgical History:   Procedure Laterality Date    HERNIA REPAIR       History reviewed. No pertinent family history.   Social History     Socioeconomic History    Marital status: Single     Spouse name: Not on file    Number of children: Not on file    Years of education: Not on file    Highest education level: Not on file   Occupational History    Not on file   Social Needs    Financial resource strain: Not on file    Food insecurity:     Worry: Not on file     Inability: Not on file    Transportation needs:     Medical: Not on file     Non-medical: Not on file   Tobacco Use    Smoking status: Current Every Day Smoker     Packs/day: 0.50     Types: Cigarettes    Smokeless tobacco: Never Used   Substance and Sexual Activity    Alcohol use: Yes     Comment: 12 pack a day    Drug use: Yes     Types: Marijuana, Methamphetamines    Sexual activity: Not on file   Lifestyle    Physical activity:     Days per week: Not on file     Minutes per session: Not on file    Stress: Not on file   Relationships    Social connections:     Talks on phone: Not on file     Gets together: Not on file     Attends [employer] service: Not on file     Active member of club or organization: Not on file     Attends meetings of clubs or organizations: Not on file     Relationship status: Not on file    Intimate partner violence:     Fear of current or ex partner: Not on file     Emotionally abused: Not on file     Physically abused: Not on file     Forced sexual activity: Not on file   Other Topics Concern    Not on file   Social History Narrative    Not on file     No current facility-administered medications for this encounter.       Current Outpatient Medications   Medication Sig Dispense Refill    ibuprofen (ADVIL;MOTRIN) 600 MG tablet Take 1 tablet by mouth every 6 hours as needed for Pain 30 tablet 0    acetaminophen (TYLENOL) 500 MG tablet Take 2 tablets by mouth every 6 hours as needed for Pain 30 tablet 1    lidocaine viscous hcl (XYLOCAINE) 2 % SOLN solution Soak cotton ball in viscous lidocaine applied to affected area every 4-6 hours as needed 120 mL 0    FLUoxetine (PROZAC) 20 MG capsule Take 40 mg by mouth daily      hydrOXYzine (ATARAX) 10 MG tablet Take 10 mg by mouth 3 times daily as needed for Itching       No Known Allergies    Nursing Notes Reviewed    Physical Exam:  ED Triage Vitals   Enc Vitals Group      BP 01/11/20 0452 130/75      Pulse 01/11/20 0452 96      Resp 01/11/20 0452 17      Temp 01/11/20 0452 97.5 °F (36.4 °C)      Temp Source 01/11/20 0452 Oral      SpO2 01/11/20 0452 96 % Weight 01/11/20 0447 190 lb (86.2 kg)      Height 01/11/20 0447 5' 11\" (1.803 m)      Head Circumference --       Peak Flow --       Pain Score --       Pain Loc --       Pain Edu? --       Excl. in [address]? --        General appearance:  No acute distress. Skin:  Warm. Dry. Eye:  Extraocular movements intact. Ears, nose, mouth and throat:  Oral mucosa moist   Neck:  Trachea midline. Extremity: Normal ROM     Heart:  Regular  Perfusion:  intact  Respiratory:   Respirations nonlabored. Abdominal:  Non distended. No tenderness to palpation. Neurological:  Alert and oriented times 3. No focal neuro deficits. Psychiatric:  Flat, + depression, + suicidal ideations, no homicidal ideations    I have reviewed and interpreted all of the currently available lab results from this visit (if applicable):  Results for orders placed or performed during the hospital encounter of 01/11/20   Urine Drug Screen   Result Value Ref Range    Cannabinoid Scrn, Ur UNCONFIRMED POSITIVE (A) NEGATIVE    Amphetamines UNCONFIRMED POSITIVE (A) NEGATIVE    Cocaine Metabolite NEGATIVE NEGATIVE    Benzodiazepine Screen, Urine NEGATIVE NEGATIVE    Barbiturate Screen, Ur NEGATIVE NEGATIVE    Opiates, Urine NEGATIVE NEGATIVE    Phencyclidine, Urine NEGATIVE NEGATIVE    Oxycodone  NEGATIVE     NEGATIVE          THRESHOLD CONCENTRATIONS (mg/dL)  AMPHT               1000  SIVAN,OPIA             300  BZO,BAR              200  PCP                   25  THC                   50  OXY                  100          IF POSITIVE, SPECIMEN WILL BE  DISCARDED AFTER 6 MONTHS. CALL LAB IF CONFIRMATION NEEDED. ALL NEGATIVE SPECIMENS WILL BE  DISCARDED AFTER ONE WEEK. * UNCONFIRMED POSITIVES MAY  NOT MEET FORENSIC REQUIREMENTS. Salicylate   Result Value Ref Range    Salicylate Lvl <5.3 (L) 15 - 30 MG/DL    DOSE AMOUNT DOSE AMT.  GIVEN - UNKNOWN     DOSE TIME DOSE TIME GIVEN - UNKNOWN    HCG Qualitative, Serum   Result Value Ref Range    hCG Qual NEGATIVE    Ethanol   Result Value Ref Range    Alcohol Scrn <0.01  THE VALUE IS BELOW OUR DETECTION LIMIT. <0.01 %WT/VOL   Acetaminophen Level   Result Value Ref Range    Acetaminophen Level <5.0 (L) 15 - 30 ug/ml    DOSE AMOUNT DOSE AMT. GIVEN - UNKNOWN     DOSE TIME DOSE TIME GIVEN - UNKNOWN    CBC   Result Value Ref Range    WBC 7.0 4.0 - 10.5 K/CU MM    RBC 4.88 4.6 - 6.2 M/CU MM    Hemoglobin 14.9 13.5 - 18.0 GM/DL    Hematocrit 44.4 42 - 52 %    MCV 91.0 78 - 100 FL    MCH 30.5 27 - 31 PG    MCHC 33.6 32.0 - 36.0 %    RDW 11.9 11.7 - 14.9 %    Platelets 876 690 - 455 K/CU MM    MPV 9.3 7.5 - 11.1 FL   Comprehensive Metabolic Panel w/ Reflex to MG   Result Value Ref Range    Sodium 135 135 - 145 MMOL/L    Potassium 3.7 3.5 - 5.1 MMOL/L    Chloride 96 (L) 99 - 110 mMol/L    CO2 27 21 - 32 MMOL/L    BUN 15 6 - 23 MG/DL    CREATININE 1.0 0.9 - 1.3 MG/DL    Glucose 93 70 - 99 MG/DL    Calcium 9.1 8.3 - 10.6 MG/DL    Alb 4.3 3.4 - 5.0 GM/DL    Total Protein 6.8 6.4 - 8.2 GM/DL    Total Bilirubin 0.5 0.0 - 1.0 MG/DL    ALT 31 10 - 40 U/L    AST 20 15 - 37 IU/L    Alkaline Phosphatase 44 40 - 129 IU/L    GFR Non-African American >60 >60 mL/min/1.73m2    GFR African American >60 >60 mL/min/1.73m2    Anion Gap 12 4 - 16        Radiographs (if obtained):  [] The following radiograph was interpreted by myself in the absence of a radiologist:   [] Radiologist's Report Reviewed:  No orders to display         EKG (if obtained): (All EKG's are interpreted by myself in the absence of a cardiologist)    Chart review shows recent radiographs:  No results found. MDM:  Differential Diagnosis considered:   Depression, bipolar disorder, substance abuse, hypothyroidism    Plan:  Suicide precautions  Urine studies  CBC, chemistry  ASA, Tylenol levels  Consult Behavioral Health    ED Course/MDM:   Patient arrived hemodynamically stable and in no acute distress.    The patient was placed in suicide precautions,

## 2020-01-11 NOTE — ED NOTES
Patient changed into a green gown, belongings bagged, security called for belongings check in. Patient provided urine sample, calm and cooperative.        Tati Zuñiga RN  01/11/20 4208

## 2020-01-12 NOTE — ED NOTES
Provisional Diagnosis:  Homeless  Polysubstance Abuse per Urine Drug Screen Results    Psychosocial and Contextual Factors: \"Pete\", (as he prefers to be called), says he used meth 3 days ago, (had told Provider at admission he used meth yesterday), says he came here from [place] to live with his family and they will not permit him to stay in their home, he is now homeless and says his family will not allow him to live with them due to his drug use. Shares not taking Rx Meds in a week, (Prozac), then tells me he was hospitalized a few months ago in [place] for suicidal thoughts, shows me very superficial marks on his left wrist with no further comments. Says he has no plan for suicide at this time, is upset that he is homeless and depressed that he has no where to go if it snows, but, continues to say that he has no plan at this time to harm himself. Notes being sober for at least 3 months from meth use, had just finished a program at [facility] recently. Shares he has a Sponsor in [employer] and attends NA Meetings, then changes the subject to feeling he will need help with his depression, but again says that he doesn't plan to harm himself. Provides very inconsistent history and is hesitant to discuss drug use or family issues, changes to subject to his weather concerns for homeless individuals and himself. Says he quit school in the 10th grade and overall, has wandered to various places in the last few years. .  Repeatedly says he has no suicide plan, has no stated suicidal thoughts, just feels that he will become very depressed if the weather becomes cold and he is homeless. Not willing to hear options a local men's shelter, wants a place to solve his living situation that is more permanent per his statements. Adamantly denies suicidal ideation, denies suicide plan, denies any and all intentions for self harm. Denies homicidal ideation and plan.   Denies auditory and visual hallucinations. Demonstrates limited judgement and limited insight. Says he has no problems with sleep or appetite. C-SSRS Summary:    Patient: As above. Family: No further history is shared. Agency: Says he had been admitted for Depression in [place] a few months ago. Shares completing treatment in the last few months at [facility] for addiction issues. ( Not specific with dates, etc.)      Present Suicidal Behavior:     Verbal: Denies    Attempt: Denies    Past Suicidal Behavior:     Verbal: Acknowledges Depression Only    Attempt: Shows me superficial marks on his wrist from, he says a couple of months ago but doesn't share suicidal ideation or plan as reason for admission, only says he was depressed. Self-Injurious/Self-Mutilation: Superficial marks on left wrist as per above notes. isn't specific to state how or when this was done, just says it was, Rodger Sers couple of months ago\". Trauma Identified: Homeless      Protective Factors:    Denies suicidal ideation, denies suicide plan, denies any and all intentions for self harm. Denies homicidal ideation and plan. Denies auditory and visual hallucinations. Denies problems with sleep and appetite. Risk Factors:    Polysubstance Abuse. Homeless    Clinical Summary:    As above. Shared documentation noted with Dr. Audrey Seth, ED Physician,. Her recommendation at this time is for discharge.   Electronically signed by Benny Chong RN on 5/58/0872 at 9:36 PM     Benny Chong RN  85/49/97 6224

## 2020-01-12 NOTE — ED NOTES
Pt is in bed with sitter at bedside, suicide precautions in place, and no needs at this time     Alana Cortes RN  01/11/20 8884

## 2020-01-12 NOTE — ED NOTES
Pt agreed to leave and signed discharge paperwork, pt understands that if he needs medical attention that he may return to the hospital.     Mindy Guerra RN  01/11/20 0046

## 2020-01-12 NOTE — CARE COORDINATION
Consult requested by Livier Sifuentes RN. Pt is agitated because he is being discharged even though he is homeless. LSW explained that the [facility] does not accept intakes this late and that pt would have to go first thing in the morning. LSW offered pt transportation to the [employer] [facility], but pt refused.

## 2020-01-12 NOTE — ED NOTES
Pt was offered a ride to a [facility] and refused.      Pastora Gardner, ENOCH  01/11/20 8644

## 2020-01-22 ENCOUNTER — HOSPITAL ENCOUNTER (EMERGENCY)
Age: 25
Discharge: HOME | End: 2020-01-22
Payer: COMMERCIAL

## 2020-01-22 VITALS
SYSTOLIC BLOOD PRESSURE: 140 MMHG | OXYGEN SATURATION: 100 % | HEIGHT: 71 IN | BODY MASS INDEX: 26.6 KG/M2 | DIASTOLIC BLOOD PRESSURE: 83 MMHG | RESPIRATION RATE: 16 BRPM | TEMPERATURE: 97.6 F | WEIGHT: 190 LBS | HEART RATE: 80 BPM

## 2020-01-22 PROCEDURE — 99282 EMERGENCY DEPT VISIT SF MDM: CPT

## 2020-01-22 ASSESSMENT — PAIN SCALES - GENERAL: PAINLEVEL_OUTOF10: 6

## 2020-01-22 NOTE — ED NOTES
Pt reports not having a home however usually has a place to sleep.   Last night he had to sleep outside and is concerned he has frostbite on his feet     Vivian Machuca RN  01/22/20 6507

## 2020-01-23 NOTE — ED PROVIDER NOTES
EMERGENCY DEPARTMENT ENCOUNTER      PCP: Jose Guerra MD    [address] [facility]    Chief Complaint   Patient presents with    Foot Pain     c/o bilateral foot pain; states that he slept outside last night    Foot Injury     bilateral; states that he slept outside last night; states that his feet are purple       This patient was not evaluated by the attending physician. I have independently evaluated this patient. HPI    Corry Alex is a 21 y.o. male who presents with pain of both feet. Onset was this morning upon waking. The context is patient is homeless and slept outside last night. When he woke this morning he has pain in his feet. He was wearing socks and shoes last night. Pain is localized to toes of both feet with left worse than right. The pain severity is 6/10. The patient has no associated other injuries. The pain is aggravated by ambulation and direct palpation. Patient reports associated tingling of all toes. Patient reports his feet look purple in color. Patient denies fever, chills, numbness, weakness, functional/motor deficits.     REVIEW OF SYSTEMS    General: No fever or chills  Skin: No lacerations or puncture wounds  Neurologic: See HPI  Musculoskeletal: See HPI; No other joint pain    All other review of systems are negative  See HPI and nursing notes for additional information       PAST MEDICAL & SURGICAL HISTORY    Past Medical History:   Diagnosis Date    Depression      Past Surgical History:   Procedure Laterality Date    HERNIA REPAIR         CURRENT MEDICATIONS    Current Outpatient Rx   Medication Sig Dispense Refill    ibuprofen (ADVIL;MOTRIN) 600 MG tablet Take 1 tablet by mouth every 6 hours as needed for Pain 30 tablet 0    acetaminophen (TYLENOL) 500 MG tablet Take 2 tablets by mouth every 6 hours as needed for Pain 30 tablet 1    lidocaine viscous hcl (XYLOCAINE) 2 % SOLN solution Soak cotton ball in viscous lidocaine applied to affected area every 4-6 deformity or swelling. There is macular erythema of all toes and foot and all toes are cool to the touch. There is tenderness to palpation over all toes without point tenderness. No tenderness to palpation of calcaneous, navicular, talus, cuneiforms, metatarsals. No palpable defects. Full active range of motion of all toes. The ankle joint is stable. Achilles tendon intact    No swelling, discoloration, or tenderness to palpation of the proximal to distal lower leg bones, ankle. Distal capillary refill, sensation, motor intact but capillary refill is delayed at about 5 seconds. Right Lower Extemity:  The Right foot demonstrates no gross deformity or swelling. There is macular erythema of all toes and foot and all toes are cool to the touch. There is tenderness to palpation over all toes without point tenderness. No tenderness to palpation of calcaneous, navicular, talus, cuneiforms, metatarsals. No palpable defects. Full active range of motion of all toes. The ankle joint is stable. Achilles tendon intact    No swelling, discoloration, or tenderness to palpation of the proximal to distal lower leg bones, ankle. Distal capillary refill, sensation, motor intact but capillary refill is delayed at about 5 seconds. Upon recheck after rewarming- capillary refill brisk < 3 seconds bilaterally. Vascular:  DP pulse 2+, capillary refill intact. Integument:  No open wounds. Skin intact. No rash - improving erythema of both feet with rewarming. No blisters. No vesicles, bullae, petechiae, purpura. Neurologic:  Awake alert, no slurred speech     RADIOLOGY   No orders to display                 ED COURSE & MEDICAL DECISION MAKING       Vital signs and nursing notes reviewed during ED course. I have independently evaluated this patient. Supervising physician present in the Emergency Department, available for consultation, throughout entirety of  patient care.   All pertinent Lab data and radiographic results

## 2020-02-20 ENCOUNTER — HOSPITAL ENCOUNTER (EMERGENCY)
Age: 25
Discharge: HOME | End: 2020-02-20
Payer: COMMERCIAL

## 2020-02-20 VITALS
WEIGHT: 190 LBS | DIASTOLIC BLOOD PRESSURE: 97 MMHG | SYSTOLIC BLOOD PRESSURE: 127 MMHG | OXYGEN SATURATION: 100 % | TEMPERATURE: 98.1 F | RESPIRATION RATE: 18 BRPM | HEART RATE: 102 BPM | HEIGHT: 71 IN | BODY MASS INDEX: 26.6 KG/M2

## 2020-02-20 PROCEDURE — 99282 EMERGENCY DEPT VISIT SF MDM: CPT

## 2020-02-20 RX ORDER — IBUPROFEN 600 MG/1
600 TABLET ORAL EVERY 6 HOURS PRN
Qty: 20 TABLET | Refills: 0 | Status: SHIPPED | OUTPATIENT
Start: 2020-02-20 | End: 2022-02-08

## 2020-02-20 RX ORDER — DOXYCYCLINE 100 MG/1
100 TABLET ORAL 2 TIMES DAILY
Qty: 14 TABLET | Refills: 0 | Status: SHIPPED | OUTPATIENT
Start: 2020-02-20 | End: 2020-02-27

## 2020-02-20 RX ORDER — CETIRIZINE HYDROCHLORIDE 10 MG/1
10 TABLET ORAL DAILY
Qty: 20 TABLET | Refills: 0 | Status: SHIPPED | OUTPATIENT
Start: 2020-02-20 | End: 2020-05-31

## 2020-02-20 ASSESSMENT — PAIN SCALES - GENERAL: PAINLEVEL_OUTOF10: 5

## 2020-02-20 NOTE — ED PROVIDER NOTES
EMERGENCY DEPARTMENT ENCOUNTER      PCP: Kartik Eckert MD    CHIEF COMPLAINT    No chief complaint on file. This patient was not evaluated by the attending physician. I have independently evaluated this patient . HPI    Harshad Billingsley is a 21 y.o. male who presents to the emergency department today with a possible spider bite and associated abscess to the anterior aspect of the right thigh. States he noticed it earlier today but is unsure when he was bitten. States become more painful and red. He did not see any other spiders. No history of recurrent abscesses in the past.  Denies history of IV drug use.     REVIEW OF SYSTEMS    Constitutional:  Denies fever, chills  HENT:  Denies sore throat or ear pain   Respiratory:  Denies cough or shortness of breath   Cardiovascular:  Denies chest pain, palpitations or swelling   GI:  Denies abdominal pain, nausea, vomiting, or diarrhea   Musculoskeletal:  Denies back pain   Skin:  See HPI  Neurologic:  Denies headache, focal weakness or sensory changes   Endocrine:  Denies polyuria or polydypsia   Lymphatic:  Denies swollen glands   All other review of systems are negative  See HPI and nursing notes for additional information     PAST MEDICAL HISTORY    Past Medical History:   Diagnosis Date    Depression        SURGICAL HISTORY    Past Surgical History:   Procedure Laterality Date    HERNIA REPAIR         CURRENT MEDICATIONS    Current Outpatient Rx   Medication Sig Dispense Refill    doxycycline monohydrate (ADOXA) 100 MG tablet Take 1 tablet by mouth 2 times daily for 7 days 14 tablet 0    ibuprofen (IBU) 600 MG tablet Take 1 tablet by mouth every 6 hours as needed for Pain 20 tablet 0    cetirizine (ZYRTEC ALLERGY) 10 MG tablet Take 1 tablet by mouth daily 20 tablet 0    acetaminophen (TYLENOL) 500 MG tablet Take 2 tablets by mouth every 6 hours as needed for Pain 30 tablet 1    lidocaine viscous hcl (XYLOCAINE) 2 % SOLN solution Soak cotton ball in viscous lidocaine applied to affected area every 4-6 hours as needed 120 mL 0    FLUoxetine (PROZAC) 20 MG capsule Take 40 mg by mouth daily      hydrOXYzine (ATARAX) 10 MG tablet Take 10 mg by mouth 3 times daily as needed for Itching         ALLERGIES    No Known Allergies    FAMILY HISTORY    No family history on file.     SOCIAL HISTORY    Social History     Socioeconomic History    Marital status: Single     Spouse name: Not on file    Number of children: Not on file    Years of education: Not on file    Highest education level: Not on file   Occupational History    Not on file   Social Needs    Financial resource strain: Not on file    Food insecurity:     Worry: Not on file     Inability: Not on file    Transportation needs:     Medical: Not on file     Non-medical: Not on file   Tobacco Use    Smoking status: Current Every Day Smoker     Packs/day: 0.50     Types: Cigarettes    Smokeless tobacco: Never Used   Substance and Sexual Activity    Alcohol use: Yes     Comment: 12 pack a day    Drug use: Yes     Types: Marijuana, Methamphetamines    Sexual activity: Not on file   Lifestyle    Physical activity:     Days per week: Not on file     Minutes per session: Not on file    Stress: Not on file   Relationships    Social connections:     Talks on phone: Not on file     Gets together: Not on file     Attends [employer] service: Not on file     Active member of club or organization: Not on file     Attends meetings of clubs or organizations: Not on file     Relationship status: Not on file    Intimate partner violence:     Fear of current or ex partner: Not on file     Emotionally abused: Not on file     Physically abused: Not on file     Forced sexual activity: Not on file   Other Topics Concern    Not on file   Social History Narrative    Not on file       PHYSICAL EXAM    VITAL SIGNS: BP (!) 127/97   Pulse 102   Temp 98.1 °F (36.7 °C)   Resp 18   SpO2 100%   Constitutional:  Well

## 2020-03-25 ENCOUNTER — HOSPITAL ENCOUNTER (EMERGENCY)
Age: 25
Discharge: HOME | End: 2020-03-26
Attending: EMERGENCY MEDICINE
Payer: COMMERCIAL

## 2020-03-25 PROCEDURE — 99284 EMERGENCY DEPT VISIT MOD MDM: CPT

## 2020-03-25 PROCEDURE — 96372 THER/PROPH/DIAG INJ SC/IM: CPT

## 2020-03-25 PROCEDURE — 6360000002 HC RX W HCPCS: Performed by: EMERGENCY MEDICINE

## 2020-03-25 RX ORDER — ZIPRASIDONE MESYLATE 20 MG/ML
20 INJECTION, POWDER, LYOPHILIZED, FOR SOLUTION INTRAMUSCULAR ONCE
Status: COMPLETED | OUTPATIENT
Start: 2020-03-25 | End: 2020-03-25

## 2020-03-25 RX ORDER — LORAZEPAM 2 MG/ML
2 INJECTION INTRAMUSCULAR ONCE
Status: COMPLETED | OUTPATIENT
Start: 2020-03-25 | End: 2020-03-25

## 2020-03-25 RX ORDER — DIPHENHYDRAMINE HYDROCHLORIDE 50 MG/ML
50 INJECTION INTRAMUSCULAR; INTRAVENOUS ONCE
Status: COMPLETED | OUTPATIENT
Start: 2020-03-25 | End: 2020-03-25

## 2020-03-25 RX ADMIN — LORAZEPAM 2 MG: 2 INJECTION, SOLUTION INTRAMUSCULAR; INTRAVENOUS at 22:13

## 2020-03-25 RX ADMIN — DIPHENHYDRAMINE HYDROCHLORIDE 50 MG: 50 INJECTION INTRAMUSCULAR; INTRAVENOUS at 22:12

## 2020-03-25 RX ADMIN — ZIPRASIDONE MESYLATE 20 MG: 20 INJECTION, POWDER, LYOPHILIZED, FOR SOLUTION INTRAMUSCULAR at 22:12

## 2020-03-26 VITALS
SYSTOLIC BLOOD PRESSURE: 120 MMHG | OXYGEN SATURATION: 98 % | DIASTOLIC BLOOD PRESSURE: 74 MMHG | HEART RATE: 68 BPM | RESPIRATION RATE: 18 BRPM | TEMPERATURE: 99.1 F

## 2020-03-26 ASSESSMENT — ENCOUNTER SYMPTOMS
ABDOMINAL PAIN: 0
SHORTNESS OF BREATH: 0
RHINORRHEA: 0
EYE PAIN: 0
BACK PAIN: 0
EYE DISCHARGE: 0
COUGH: 0
SORE THROAT: 0
NAUSEA: 0
VOMITING: 0

## 2020-03-26 NOTE — ED PROVIDER NOTES
03/26/20 a.m. Rehan Polk was checked out to me by Dr. Salina Mendez. Please see his/her initial documentation for details of the patient's ED presentation, physical exam and completed studies. In brief, Rehan Polk is a 21 y.o. male that presents with meth use requiring chemical sedation awaiting sobriety. I have reviewed and interpreted all of the currently available lab results from this visit (if applicable):  No results found for this visit on 03/25/20. MDM:    Patient presents with meth use, requiring chemical sedation, awaiting sobriety. Patient rechecked sleeping, no distress. Will sign out patient to Dr. Katty Newsome. Final Impression:  1.  Methamphetamine intoxication ([facility] [address]        (Please note that portions of this note may have been completed with a voice recognition program. Efforts were made to edit the dictations but occasionally words are mis-transcribed.)    Rufus Wilde, [facility],   03/26/20 1683

## 2020-03-26 NOTE — ED PROVIDER NOTES
10 mg by mouth 3 times daily as needed for ItchingHistorical Med             ALLERGIES     Patient has no known allergies. FAMILY HISTORY     No family history on file.        SOCIAL HISTORY       Social History     Socioeconomic History    Marital status: Single     Spouse name: Not on file    Number of children: Not on file    Years of education: Not on file    Highest education level: Not on file   Occupational History    Not on file   Social Needs    Financial resource strain: Not on file    Food insecurity     Worry: Not on file     Inability: Not on file    Transportation needs     Medical: Not on file     Non-medical: Not on file   Tobacco Use    Smoking status: Current Every Day Smoker     Packs/day: 0.50     Types: Cigarettes    Smokeless tobacco: Never Used   Substance and Sexual Activity    Alcohol use: Yes     Comment: 12 pack a day    Drug use: Yes     Types: Marijuana, Methamphetamines    Sexual activity: Not on file   Lifestyle    Physical activity     Days per week: Not on file     Minutes per session: Not on file    Stress: Not on file   Relationships    Social connections     Talks on phone: Not on file     Gets together: Not on file     Attends [employer] service: Not on file     Active member of club or organization: Not on file     Attends meetings of clubs or organizations: Not on file     Relationship status: Not on file    Intimate partner violence     Fear of current or ex partner: Not on file     Emotionally abused: Not on file     Physically abused: Not on file     Forced sexual activity: Not on file   Other Topics Concern    Not on file   Social History Narrative    Not on file       SCREENINGS    Lis Coma Scale  Eye Opening: Spontaneous  Best Verbal Response: Oriented  Best Motor Response: Obeys commands  Lis Coma Scale Score: 15          PHYSICAL EXAM    (up to 7 for level 4, 8 or more for level 5)     ED Triage Vitals [03/25/20 2142]   BP Temp Temp Source Pulse Resp SpO2 Height Weight   (!) 148/99 99.1 °F (37.3 °C) Oral 120 25 97 % -- --       Physical Exam  Vitals signs reviewed. Constitutional:       Comments: Agitated   HENT:      Head: Normocephalic and atraumatic. Right Ear: Tympanic membrane normal.      Left Ear: Tympanic membrane normal.      Nose: No congestion or rhinorrhea. Mouth/Throat:      Mouth: Mucous membranes are moist.      Pharynx: No oropharyngeal exudate or posterior oropharyngeal erythema. Eyes:      General:         Right eye: No discharge. Left eye: No discharge. Pupils: Pupils are equal, round, and reactive to light. Neck:      Musculoskeletal: Normal range of motion. No neck rigidity or muscular tenderness. Cardiovascular:      Rate and Rhythm: Tachycardia present. Heart sounds: No friction rub. No gallop. Pulmonary:      Comments: Speaking in full sentences  No retractions, no increased work of breathing  Abdominal:      Palpations: Abdomen is soft. Tenderness: There is no abdominal tenderness. There is no guarding. Comments: Abdomen soft, non-tender, non-peritoneal  No guarding on abdominal exam   Musculoskeletal:         General: No signs of injury. Right lower leg: No edema. Left lower leg: No edema. Lymphadenopathy:      Cervical: No cervical adenopathy. Skin:     General: Skin is warm. Capillary Refill: Capillary refill takes less than 2 seconds. Findings: No erythema, lesion or rash. Neurological:      Comments: Agitated, following commands, moving all extremities spontaneously         DIAGNOSTIC RESULTS     Labs Reviewed - No data to display         RADIOLOGY:     Non-plain film images such as CT, Ultrasound and MRI are read by the radiologist. Plain radiographic images are visualized and preliminarily interpreted by the emergency physician.        Interpretation per the Radiologist below, if available at the time of this note:    No orders to display inserts with any medication that was prescribed. Major potential reactions and medication interactions were discussed. The Patient understands that there are numerous possible adverse reactions not covered. The patient was also instructed to arrange follow-up with his or her primary care provider for review of any pending labwork or incidental findings on any radiology results that were obtained. All efforts were made to discuss any incidental findings that require further monitoring. Controlled Substances Monitoring:     No flowsheet data found.     (Please note that portions of this note were completed with a voice recognition program.  Efforts were made to edit the dictations but occasionally words are mis-transcribed.)    Joe Staples MD (electronically signed)  Attending Emergency Physician           Joe Staples MD  03/27/20 5682

## 2020-03-26 NOTE — DISCHARGE INSTR - COC
Continuity of Care Form    Patient Name: Reddy Ireland   :  1995  MRN:  [id number]    Admit date:  3/25/2020  Discharge date:  ***    Code Status Order: No Order   Advance Directives:     Admitting Physician:  No admitting provider for patient encounter. PCP: Federica Santos MD    Discharging Nurse: [facility] Unit/Room#: ED14/ED-14  Discharging Unit Phone Number: ***    Emergency Contact:   Extended Emergency Contact Information  Primary Emergency Contact: mayne,trudy  Home Phone: [phone]  Mobile Phone: [phone]  Relation: Parent  Preferred language: English   needed? No    Past Surgical History:  Past Surgical History:   Procedure Laterality Date    HERNIA REPAIR         Immunization History: There is no immunization history on file for this patient. Active Problems: There is no problem list on file for this patient.       Isolation/Infection:   Isolation          No Isolation        Patient Infection Status     None to display          Nurse Assessment:  Last Vital Signs: BP (!) 109/52   Pulse 112   Temp 99.1 °F (37.3 °C) (Oral)   Resp 18   SpO2 97%     Last documented pain score (0-10 scale):    Last Weight:   Wt Readings from Last 1 Encounters:   20 190 lb (86.2 kg)     Mental Status:  {IP PT MENTAL STATUS:63474}    IV Access:  { SIVAN IV ACCESS:929262448}    Nursing Mobility/ADLs:  Walking   {CHP DME KLSP:170037910}  Transfer  {CHP DME WZSC:494144505}  Bathing  {CHP DME XKXH:784196201}  Dressing  {CHP DME ATSQ:814589087}  Toileting  {CHP DME KHAT:934214142}  Feeding  {CHP DME GVUL:476755379}  Med Admin  {CHP DME JYKY:456821092}  Med Delivery   { SIVAN MED Delivery:563250173}    Wound Care Documentation and Therapy:        Elimination:  Continence:   · Bowel: {YES / CC:33955}  · Bladder: {YES / NZ:70628}  Urinary Catheter: {Urinary Catheter:579931199}   Colostomy/Ileostomy/Ileal Conduit: {YES / LN:80972}       Date of Last BM: ***  No intake or

## 2020-04-05 ENCOUNTER — APPOINTMENT (OUTPATIENT)
Dept: GENERAL RADIOLOGY | Age: 25
End: 2020-04-05
Payer: COMMERCIAL

## 2020-04-05 ENCOUNTER — HOSPITAL ENCOUNTER (EMERGENCY)
Age: 25
Discharge: HOME | End: 2020-04-05
Payer: COMMERCIAL

## 2020-04-05 ENCOUNTER — HOSPITAL ENCOUNTER (EMERGENCY)
Age: 25
Discharge: HOME | End: 2020-04-05
Attending: EMERGENCY MEDICINE
Payer: COMMERCIAL

## 2020-04-05 VITALS
BODY MASS INDEX: 22.4 KG/M2 | HEIGHT: 71 IN | TEMPERATURE: 97.8 F | OXYGEN SATURATION: 99 % | RESPIRATION RATE: 17 BRPM | HEART RATE: 81 BPM | DIASTOLIC BLOOD PRESSURE: 74 MMHG | SYSTOLIC BLOOD PRESSURE: 121 MMHG | WEIGHT: 160 LBS

## 2020-04-05 VITALS
WEIGHT: 160 LBS | RESPIRATION RATE: 18 BRPM | BODY MASS INDEX: 22.32 KG/M2 | HEART RATE: 84 BPM | TEMPERATURE: 98.2 F | OXYGEN SATURATION: 100 % | DIASTOLIC BLOOD PRESSURE: 76 MMHG | SYSTOLIC BLOOD PRESSURE: 132 MMHG

## 2020-04-05 LAB
BACTERIA: ABNORMAL /HPF
BILIRUBIN URINE: NEGATIVE MG/DL
BLOOD, URINE: ABNORMAL
CLARITY: ABNORMAL
COLOR: ABNORMAL
GLUCOSE, URINE: NEGATIVE MG/DL
KETONES, URINE: NEGATIVE MG/DL
LEUKOCYTE ESTERASE, URINE: ABNORMAL
MUCUS: ABNORMAL HPF
NITRITE URINE, QUANTITATIVE: NEGATIVE
PH, URINE: 5 (ref 5–8)
PROTEIN UA: NEGATIVE MG/DL
RBC URINE: 2 /HPF (ref 0–3)
SPECIFIC GRAVITY UA: 1 (ref 1–1.03)
TRICHOMONAS: ABNORMAL /HPF
UROBILINOGEN, URINE: NORMAL MG/DL (ref 0.2–1)
WBC CLUMP: ABNORMAL /HPF
WBC UA: 231 /HPF (ref 0–2)

## 2020-04-05 PROCEDURE — 99283 EMERGENCY DEPT VISIT LOW MDM: CPT

## 2020-04-05 PROCEDURE — 6360000002 HC RX W HCPCS: Performed by: PHYSICIAN ASSISTANT

## 2020-04-05 PROCEDURE — 87086 URINE CULTURE/COLONY COUNT: CPT

## 2020-04-05 PROCEDURE — 6370000000 HC RX 637 (ALT 250 FOR IP): Performed by: PHYSICIAN ASSISTANT

## 2020-04-05 PROCEDURE — 2500000003 HC RX 250 WO HCPCS: Performed by: PHYSICIAN ASSISTANT

## 2020-04-05 PROCEDURE — 6370000000 HC RX 637 (ALT 250 FOR IP): Performed by: EMERGENCY MEDICINE

## 2020-04-05 PROCEDURE — 96372 THER/PROPH/DIAG INJ SC/IM: CPT

## 2020-04-05 PROCEDURE — 87491 CHLMYD TRACH DNA AMP PROBE: CPT

## 2020-04-05 PROCEDURE — 81001 URINALYSIS AUTO W/SCOPE: CPT

## 2020-04-05 PROCEDURE — 87591 N.GONORRHOEAE DNA AMP PROB: CPT

## 2020-04-05 PROCEDURE — 74018 RADEX ABDOMEN 1 VIEW: CPT

## 2020-04-05 RX ORDER — SODIUM PHOSPHATE, DIBASIC AND SODIUM PHOSPHATE, MONOBASIC 7; 19 G/133ML; G/133ML
1 ENEMA RECTAL
Status: COMPLETED | OUTPATIENT
Start: 2020-04-05 | End: 2020-04-05

## 2020-04-05 RX ORDER — POLYETHYLENE GLYCOL 3350 17 G/17G
17 POWDER, FOR SOLUTION ORAL DAILY PRN
Qty: 527 G | Refills: 1 | Status: SHIPPED | OUTPATIENT
Start: 2020-04-05 | End: 2020-05-05

## 2020-04-05 RX ORDER — AZITHROMYCIN 250 MG/1
1000 TABLET, FILM COATED ORAL ONCE
Status: COMPLETED | OUTPATIENT
Start: 2020-04-05 | End: 2020-04-05

## 2020-04-05 RX ORDER — METRONIDAZOLE 250 MG/1
2000 TABLET ORAL ONCE
Status: COMPLETED | OUTPATIENT
Start: 2020-04-05 | End: 2020-04-05

## 2020-04-05 RX ADMIN — METRONIDAZOLE 2000 MG: 250 TABLET, FILM COATED ORAL at 08:33

## 2020-04-05 RX ADMIN — AZITHROMYCIN MONOHYDRATE 1000 MG: 250 TABLET ORAL at 08:33

## 2020-04-05 RX ADMIN — SODIUM PHOSPHATE, DIBASIC AND SODIUM PHOSPHATE, MONOBASIC 1 ENEMA: 7; 19 ENEMA RECTAL at 20:19

## 2020-04-05 RX ADMIN — MAGNESIUM CITRATE 296 ML: 1.75 LIQUID ORAL at 18:14

## 2020-04-05 RX ADMIN — LIDOCAINE HYDROCHLORIDE 250 MG: 10 INJECTION, SOLUTION EPIDURAL; INFILTRATION; INTRACAUDAL; PERINEURAL at 08:33

## 2020-04-05 ASSESSMENT — ENCOUNTER SYMPTOMS
BLOOD IN STOOL: 0
COUGH: 0
ABDOMINAL PAIN: 1
NAUSEA: 0
SHORTNESS OF BREATH: 0
CONSTIPATION: 1
DIARRHEA: 0
VOMITING: 0

## 2020-04-05 ASSESSMENT — PAIN DESCRIPTION - LOCATION
LOCATION: ABDOMEN
LOCATION: PENIS

## 2020-04-05 ASSESSMENT — PAIN DESCRIPTION - PAIN TYPE
TYPE: ACUTE PAIN
TYPE: ACUTE PAIN

## 2020-04-05 ASSESSMENT — PAIN SCALES - GENERAL
PAINLEVEL_OUTOF10: 8
PAINLEVEL_OUTOF10: 9

## 2020-04-05 ASSESSMENT — PAIN DESCRIPTION - DESCRIPTORS: DESCRIPTORS: BURNING

## 2020-04-05 NOTE — ED PROVIDER NOTES
are interpreted by myself in the absence of a cardiologist)    MDM:  Differential diagnoses considered include constipation, intestinal cramping, colitis, diverticulitis. Patient arrives with normal vital signs and is nontoxic-appearing. He has some minimal tenderness on abdominal exam but no peritoneal signs. I do not suspect an acute abdomen. KUB shows a moderately large amount of stool within the colon. I suspect patient symptoms are likely due to constipation. Patient was given magnesium citrate with no successful stool. He was then given a fleets enema and was able to have a normal bowel movement. He is feeling better at this point time. Repeat abdominal exam is benign. I do not suspect an emergent cause of patient symptoms. We will discharge him home in stable condition and recommend he follow-up closely with his primary care physician. Plan of care explained to patient. Concerning signs and symptoms warranting a return visit to the Emergency Department were explained in detail. All questions and concerns were addressed to the patient's satisfaction. Patient understood and agreed with plan. The likelihood of other entities in the differential is insufficient to justify any further testing for them. This was explained to the patient. The patient was advised that persistent or worsening symptoms would requirefurther evaluation. Clinical Impression:  1. Constipation, unspecified constipation type          Burgess Arabella MD       Please note that portions of this note may have been complete with a voice recognition program.  Effortswere made to edit the dictations, but occasional words are mis-transcribed.           Burgess Arabella MD  04/08/20 5705

## 2020-04-05 NOTE — ED PROVIDER NOTES
or performed during the hospital encounter of 04/05/20   Urinalysis Reflex to Culture   Result Value Ref Range    Color, UA STRAW (A) YELLOW    Clarity, UA HAZY (A) CLEAR    Glucose, Urine NEGATIVE NEGATIVE MG/DL    Bilirubin Urine NEGATIVE NEGATIVE MG/DL    Ketones, Urine NEGATIVE NEGATIVE MG/DL    Specific Gravity, UA 1.005 1.001 - 1.035    Blood, Urine SMALL (A) NEGATIVE    pH, Urine 5.0 5.0 - 8.0    Protein, UA NEGATIVE NEGATIVE MG/DL    Urobilinogen, Urine NORMAL 0.2 - 1.0 MG/DL    Nitrite Urine, Quantitative NEGATIVE NEGATIVE    Leukocyte Esterase, Urine LARGE (A) NEGATIVE    RBC, UA 2 0 - 3 /HPF    WBC,  (H) 0 - 2 /HPF    Bacteria, UA OCCASIONAL (A) NEGATIVE /HPF    WBC Clumps, UA RARE /HPF    Mucus, UA RARE (A) NEGATIVE HPF    Trichomonas, UA NONE SEEN NONE SEEN /HPF     Gonorrhea and chlamydia testing in process, urine culture in process      ED COURSE & MEDICAL DECISION MAKING       Vital signs and nursing notes reviewed during ED course. I have independently evaluated this patient. Supervising MD present in the Emergency Department, available for consultation, throughout entirety of  patient care. History and exam is consistent with urethritis and penile discharge concerning for STI as patient has clinical symptoms (dysuria, + discharge), and positive leukocyte esterase and 231 WBC/hpf on UA with occasional bacteria. Urine culture in process. Gonorrhea and Chlamydia testing in process. Patient declines  examination in the ED. Pt understands that I am unable to fully evaluate for presenting symptoms by omiting this. The patient understands they may return to the ED at any time, without penalty or recrimminations, for reevaluation and to have  exam done. Patient was given intramuscular antibiotics and an oral antibiotic while in the ED to treat for gonorrhea and chlamydia, and patient was given metronidazole to empirically treat for trichomonas.  Patient understands to not consume alcohol report has been produced using speech recognition software and may contain errors related to that system including errors in grammar, punctuation, and spelling, as well as words and phrases that may be inappropriate. If there are any questions or concerns please feel free to contact the dictating provider for clarification.           Tamika Petty PA-C  04/05/20 2288

## 2020-04-06 ENCOUNTER — CARE COORDINATION (OUTPATIENT)
Dept: FAMILY MEDICINE CLINIC | Age: 25
End: 2020-04-06

## 2020-04-06 LAB
CULTURE: NORMAL
Lab: NORMAL
SPECIMEN: NORMAL

## 2020-04-06 NOTE — ED NOTES
Patient with successful bowel movement at this time. Patient discharge instructions reviewed and RN explained to patient cramping from medications and continued stool is expected throughout evening. Patient verbalizes understanding.       Laurence Cheng RN  04/05/20 6694

## 2020-04-07 ENCOUNTER — CARE COORDINATION (OUTPATIENT)
Dept: FAMILY MEDICINE CLINIC | Age: 25
End: 2020-04-07

## 2020-04-07 NOTE — CARE COORDINATION
ACM attempted 2nd outreach for ED follow up, multiple concerns, COVID 19 protocol    No answer and unable to LVM    ACM will resolve episode at this time.

## 2020-04-08 LAB
CHLAMYDIA TRACHOMATIS AMPLIFIED DET: ABNORMAL
CHLAMYDIA TRACHOMATIS AMPLIFIED DET: NEGATIVE
N GONORRHOEAE AMPLIFIED DET: ABNORMAL
N GONORRHOEAE AMPLIFIED DET: POSITIVE

## 2020-05-31 ENCOUNTER — HOSPITAL ENCOUNTER (EMERGENCY)
Age: 25
Discharge: HOME | End: 2020-05-31
Payer: COMMERCIAL

## 2020-05-31 VITALS
RESPIRATION RATE: 16 BRPM | HEIGHT: 71 IN | OXYGEN SATURATION: 96 % | DIASTOLIC BLOOD PRESSURE: 61 MMHG | HEART RATE: 93 BPM | SYSTOLIC BLOOD PRESSURE: 105 MMHG | WEIGHT: 167 LBS | TEMPERATURE: 98.2 F | BODY MASS INDEX: 23.38 KG/M2

## 2020-05-31 PROCEDURE — 99282 EMERGENCY DEPT VISIT SF MDM: CPT

## 2020-05-31 RX ORDER — DOXYCYCLINE HYCLATE 100 MG
100 TABLET ORAL 2 TIMES DAILY
Qty: 20 TABLET | Refills: 0 | Status: SHIPPED | OUTPATIENT
Start: 2020-05-31 | End: 2020-06-10

## 2020-05-31 ASSESSMENT — PAIN SCALES - GENERAL: PAINLEVEL_OUTOF10: 7

## 2020-05-31 ASSESSMENT — PAIN DESCRIPTION - ORIENTATION: ORIENTATION: LEFT

## 2020-05-31 ASSESSMENT — PAIN DESCRIPTION - LOCATION: LOCATION: FOOT

## 2020-05-31 ASSESSMENT — PAIN DESCRIPTION - PAIN TYPE: TYPE: ACUTE PAIN

## 2020-06-01 NOTE — ED PROVIDER NOTES
Triage Chief Complaint:   Foot Pain (patient has open sore on the left foot)    [place]:  Today in the ED I had the pleasure of caring for Paulina Goodell who is a 31 y.o. male that presents to the ED complaining of foot pain. Foot pain is been ongoing x2 days. He states \"I do not know what happened\". He denies fever chills nausea vomiting diarrhea. No blunt trauma. ROS:  REVIEW OF SYSTEMS    At least 04 systems reviewed      All other review of systems are negative  See HPI and nursing notes for additional information       Past Medical History:   Diagnosis Date    Depression      Past Surgical History:   Procedure Laterality Date    HERNIA REPAIR       History reviewed. No pertinent family history.   Social History     Socioeconomic History    Marital status: Single     Spouse name: Not on file    Number of children: Not on file    Years of education: Not on file    Highest education level: Not on file   Occupational History    Not on file   Social Needs    Financial resource strain: Not on file    Food insecurity     Worry: Not on file     Inability: Not on file    Transportation needs     Medical: Not on file     Non-medical: Not on file   Tobacco Use    Smoking status: Current Every Day Smoker     Packs/day: 0.50     Types: Cigarettes    Smokeless tobacco: Never Used   Substance and Sexual Activity    Alcohol use: Yes     Comment: 12 pack a day    Drug use: Yes     Types: Marijuana, Methamphetamines     Comment: ice    Sexual activity: Not on file   Lifestyle    Physical activity     Days per week: Not on file     Minutes per session: Not on file    Stress: Not on file   Relationships    Social connections     Talks on phone: Not on file     Gets together: Not on file     Attends [employer] service: Not on file     Active member of club or organization: Not on file     Attends meetings of clubs or organizations: Not on file     Relationship status: Not on file    Intimate partner violence

## 2020-06-07 ENCOUNTER — HOSPITAL ENCOUNTER (EMERGENCY)
Age: 25
Discharge: LEFT BEFORE DISCHARGE | End: 2020-06-07
Payer: COMMERCIAL

## 2020-06-07 VITALS
BODY MASS INDEX: 18.96 KG/M2 | TEMPERATURE: 98 F | OXYGEN SATURATION: 100 % | RESPIRATION RATE: 18 BRPM | HEIGHT: 72 IN | SYSTOLIC BLOOD PRESSURE: 139 MMHG | WEIGHT: 140 LBS | HEART RATE: 90 BPM | DIASTOLIC BLOOD PRESSURE: 101 MMHG

## 2020-06-07 PROCEDURE — 99284 EMERGENCY DEPT VISIT MOD MDM: CPT

## 2020-06-07 NOTE — ED TRIAGE NOTES
Patient \"snorted fentanyl\" this morning and overdosed. Patient's family gave 2 doses of Narcan before EMS arrival.  Patient denies any attempts of self-harm. States he just took \"too much fentanyl\" this morning.

## 2020-06-07 NOTE — ED PROVIDER NOTES
Eyes:  EOMI. Sclera clear. Conjunctiva normal, No discharge. Respiratory:  Respirations nonnlabored, 100% on room air. Speaking full sentences no difficulty  Musculoskeletal: BUE/BLE symmetrical without atrophy or deformities  Integument:  Warm, Dry, Intact, Skin turgor and texture normal  Neurologic:  Alert & oriented x3 , No focal deficits noted. Cranial nerves II through XII grossly intact. No slurred speech. No facial droop. No gait ataxia  Psychiatric: Denying any SI/HI. Uncooperative. Not responding to internal stimuli. I have reviewed and interpreted all of the currently available lab results from this visit (if applicable):  No results found for this visit on 06/07/20. Radiographs (if obtained):  [] The following radiograph was interpreted by myself in the absence of a radiologist:   [] Radiologist's Report Reviewed:  No orders to display        Chart review shows recent radiographs:  No results found. ED COURSE & MEDICAL DECISION MAKING       Vital signs and nursing notes reviewed during ED course. I have independently evaluated this patient . Supervising physician - Dr. Jacinta Langford - was present in ED and available for consultation throughout entirety of patient's care. All pertinent Lab data and radiographic results reviewed with patient at bedside. The patient and/or the family were informed of the results of any tests/labs/imaging, the treatment plan, and time was allotted to answer questions. Clinical Impression:  1. Opiate overdose, accidental or unintentional, initial encounter ([facility] [address]    2. Left against medical advice        Patient presents via EMS for opiate overdose, unintentional.  My exam, patient is awake and alert, agitated but in no acute respiratory distress. Vitals are stable, 100% on room air. He is noted ambulatory to and from the bathroom with a steady gait.   He is refusing an examination and is very uncooperative with providing HPI and for an exam. States multiple times \"I do want to be here, I want to go home. \"  He did receive 2 doses of Narcan of unknown dose prior to arrival.  I discussed with him that there could be a possibility for rebound of his symptoms and I did strongly recommend a period of monitoring to ensure that he will not need further doses of Narcan. He is adamantly refusing further work-up and wants to leave now. He is being any intent for self-harm, no SI/HI or hallucinations. He is refraining further labs, work-up or telemetry monitoring. He does appear competent to make his own medical decisions. I did offer to call family member sto pick him up which he is also refusing. He understands the risks of not completing his ER evaluation. The patient has decided to leave against medical advice, because \"I don't want to be here, you are asking too many questions, I just want to go home. \" Patient has normal mental status and adequate capacity to make medical decisions. The patient refuses completion of ED visit and wants to be discharged. The risks of leaving AMA have been explained to the patient, including worsening illness, chronic pain, permanent disability, and death. The benefits of completing ED evaluation have also been explained, including the availability and proximity of nurses, physicians, monitoring, diagnostic testing, and treatment. The patient was able to understand and state the risks and benefits of hospital admission. This was witnessed by nurse Ray Cat RN and me. Patient had the opportunity to ask questions about her medical condition. The patient was treated to the extent that she would allow, and knows that she may return for care at any time or if he/she would like to complete their evaluation. He left AMA without paperwork, refused to sign AMA paperwork with the ED nurse or myself. I did well come back to the ED at anytime should he change his mind about further evaluation or completion of ER work-up.     Patient agrees to return emergency department if symptoms worsen or any new symptoms develop. Attending physician - Dr. Kishan Deshpande - was consulted on this case, but did not independently evaluate the patient     In light of current events, I did utilize appropriate PPE (including N95 face mask, safety glasses, gloves, as recommended by the health facility/national standard best practice, during my bedside interactions with the patient. Disposition referral (if applicable):  No follow-up provider specified.     Disposition medications (if applicable):  Discharge Medication List as of 6/7/2020  9:59 AM            (Please note that portions of this note may have been completed with a voice recognition program. Efforts were made to edit the dictations but occasionally words are mis-transcribed.)         Greg Hendrickson PA-C  06/07/20 7170

## 2020-10-24 ENCOUNTER — HOSPITAL ENCOUNTER (EMERGENCY)
Age: 25
Discharge: LEFT BEFORE DISCHARGE | End: 2020-10-24
Payer: COMMERCIAL

## 2020-10-24 VITALS
SYSTOLIC BLOOD PRESSURE: 139 MMHG | HEART RATE: 90 BPM | OXYGEN SATURATION: 100 % | TEMPERATURE: 97.8 F | RESPIRATION RATE: 18 BRPM | DIASTOLIC BLOOD PRESSURE: 96 MMHG

## 2020-10-24 PROCEDURE — 4500000002 HC ER NO CHARGE

## 2020-10-24 NOTE — ED NOTES
Patient at desk states that he is just going to leave. Patient signed LWT paperwork.         Alejandrina Carrizales RN  10/24/20 9782

## 2020-10-24 NOTE — ED NOTES
Patient presents to Ed after attempting to shoot up meth, states that he \"missed his vein and hit something\" reports that he has shot up meth in the past and has never felt like this. Patient appears anxious, states that he thinks he may have gotten an [place] bubble\". Patient states \" do you see my veins disappearing\". Patient taken to triage and patient states \" before we do all this do you think im just tripping\". Explain to patient I am unable to determine that and I encourage him to wait to see a physician. Patient states he doesn't want to stay. Patient denies any suicidal or homicidal. Patient then stands at desk continuously looking at his hand apologetic about coming to Ed. Patient alert and oriented.      Chad Cortes RN  10/24/20 4750

## 2020-10-24 NOTE — ED NOTES
Patient in agreement to stay. Then security witnessed patient leaving.         Neville Thompson RN  10/24/20 5719

## 2020-12-22 ENCOUNTER — HOSPITAL ENCOUNTER (EMERGENCY)
Age: 25
Discharge: HOME | End: 2020-12-23
Payer: COMMERCIAL

## 2020-12-22 LAB
AMPHETAMINES: ABNORMAL
BARBITURATE SCREEN URINE: NEGATIVE
BENZODIAZEPINE SCREEN, URINE: NEGATIVE
CANNABINOID SCREEN URINE: ABNORMAL
COCAINE METABOLITE: NEGATIVE
OPIATES, URINE: NEGATIVE
OXYCODONE: NEGATIVE
PHENCYCLIDINE, URINE: NEGATIVE

## 2020-12-22 PROCEDURE — 99282 EMERGENCY DEPT VISIT SF MDM: CPT

## 2020-12-22 PROCEDURE — 80307 DRUG TEST PRSMV CHEM ANLYZR: CPT

## 2020-12-23 VITALS
HEART RATE: 90 BPM | DIASTOLIC BLOOD PRESSURE: 81 MMHG | SYSTOLIC BLOOD PRESSURE: 125 MMHG | RESPIRATION RATE: 18 BRPM | OXYGEN SATURATION: 97 %

## 2020-12-23 NOTE — ED PROVIDER NOTES
Triage Chief Complaint:   Drug / Alcohol Assessment (states did meth)    Kashia:  Today in the ED I had the pleasure of caring for Shanthi Dixon who is a 31 y.o. male that presents today to the ED for evaluation patient endorses doing fentanyl as well as meth. States he  saw the happens and then came back to life. He walked here from the other side of town. States he has nowhere to go. He denies any chest pain back pain head pain headache no auditory visual hallucinations. Does endorse injecting and polysubstance abuse. Denies suicide suicidality or homicidality. ROS:  REVIEW OF SYSTEMS    At least 10 systems reviewed      All other review of systems are negative  See HPI and nursing notes for additional information       Past Medical History:   Diagnosis Date    Depression      Past Surgical History:   Procedure Laterality Date    HERNIA REPAIR       No family history on file.   Social History     Socioeconomic History    Marital status: Single     Spouse name: Not on file    Number of children: Not on file    Years of education: Not on file    Highest education level: Not on file   Occupational History    Not on file   Social Needs    Financial resource strain: Not on file    Food insecurity     Worry: Not on file     Inability: Not on file    Transportation needs     Medical: Not on file     Non-medical: Not on file   Tobacco Use    Smoking status: Current Every Day Smoker     Packs/day: 0.50     Types: Cigarettes    Smokeless tobacco: Never Used   Substance and Sexual Activity    Alcohol use: Yes     Comment: 12 pack a day    Drug use: Yes     Types: Marijuana, Methamphetamines     Comment: ice    Sexual activity: Not on file   Lifestyle    Physical activity     Days per week: Not on file     Minutes per session: Not on file    Stress: Not on file   Relationships    Social connections     Talks on phone: Not on file     Gets together: Not on file     Attends [employer] service: Not on file     Active member of club or organization: Not on file     Attends meetings of clubs or organizations: Not on file     Relationship status: Not on file    Intimate partner violence     Fear of current or ex partner: Not on file     Emotionally abused: Not on file     Physically abused: Not on file     Forced sexual activity: Not on file   Other Topics Concern    Not on file   Social History Narrative    Not on file     No current facility-administered medications for this encounter. Current Outpatient Medications   Medication Sig Dispense Refill    ibuprofen (IBU) 600 MG tablet Take 1 tablet by mouth every 6 hours as needed for Pain 20 tablet 0     No Known Allergies    Nursing Notes Reviewed    Physical Exam:  ED Triage Vitals [12/22/20 2203]   Enc Vitals Group      BP       Pulse 97      Resp 21      Temp       Temp src       SpO2 95 %      Weight       Height       Head Circumference       Peak Flow       Pain Score       Pain Loc       Pain Edu? Excl. in [address]? General :Patient is awake alert oriented person place and time no acute distress nontoxic appearing  HEENT: Pupils are equally round and reactive to light extraocular motors are intact conjunctivae clear sclerae white there is no injection no icterus. Nose without any rhinorrhea or epistaxis. Oral mucosa is moist no exudate buccal mucosa shows no ulcerations. Uvula is midline    Neck: Neck is supple full range of motion trachea midline thyroid nonpalpable  Cardiac: Heart regular rate rhythm no murmurs rubs clicks or gallops  Lungs: Lungs are clear to auscultation there is no wheezing rhonchi or rales. There is no use of accessory muscles no nasal flaring identified. Chest wall: There is no tenderness to palpation over the chest wall or over ribs  Abdomen: Abdomen is soft nontender nondistended.  There is no firm or pulsatile masses no rebound rigidity or guarding negative Danielle's negative McBurney, no peritoneal methamphetamines. Patient is educated on polysubstance abuse. Observed here times many hours. Uneventful. Will be discharged home   I independently managed patient today in the ED      /81   Pulse 90   Resp 18   SpO2 97%       Clinical Impression:  1. Polysubstance abuse [facility])        Disposition referral (if applicable):  Aron Roy MD  [address] [facility]  [phone]    In 2 days      Disposition medications (if applicable):  New Prescriptions    No medications on file         Comment: Please note this report has been produced using speech recognition software and may contain errors related to that system including errors in grammar, punctuation, and spelling, as well as words and phrases that may be inappropriate. If there are any questions or concerns please feel free to contact the dictating provider for clarification.       Ron Santiago, [address] [place] [address]  12/23/20 9720

## 2020-12-23 NOTE — ED NOTES
Pt walked into ER. Pt states \"He took to much fentanyl, other unknown drugs, He  and came back to life [place] an alley. \" Pt presents with anxiety, nervousness.        Rizwan Miller RN  20 [phone]

## 2020-12-23 NOTE — ED NOTES
Pt discharged. Pt hesitant to leave ED, pt currently sitting out in the waiting room. Discharge instructions reviewed, Pt V/U.       Lakia Doty RN  12/23/20 6078

## 2022-01-24 ENCOUNTER — HOSPITAL ENCOUNTER (EMERGENCY)
Age: 27
Discharge: HOME | End: 2022-01-24
Attending: EMERGENCY MEDICINE

## 2022-01-24 VITALS
TEMPERATURE: 98.1 F | HEIGHT: 71 IN | OXYGEN SATURATION: 96 % | HEART RATE: 92 BPM | RESPIRATION RATE: 20 BRPM | SYSTOLIC BLOOD PRESSURE: 123 MMHG | DIASTOLIC BLOOD PRESSURE: 78 MMHG | WEIGHT: 170 LBS | BODY MASS INDEX: 23.8 KG/M2

## 2022-01-24 DIAGNOSIS — E87.6 HYPOKALEMIA: Primary | ICD-10-CM

## 2022-01-24 DIAGNOSIS — F15.10 METHAMPHETAMINE USE (HCC): ICD-10-CM

## 2022-01-24 DIAGNOSIS — Z13.9 ENCOUNTER FOR MEDICAL SCREENING EXAMINATION: ICD-10-CM

## 2022-01-24 LAB
ALBUMIN SERPL-MCNC: 4 GM/DL (ref 3.4–5)
ALCOHOL SCREEN SERUM: <0.01 %WT/VOL
ALP BLD-CCNC: 58 IU/L (ref 40–129)
ALT SERPL-CCNC: 12 U/L (ref 10–40)
ANION GAP SERPL CALCULATED.3IONS-SCNC: 8 MMOL/L (ref 4–16)
AST SERPL-CCNC: 16 IU/L (ref 15–37)
BILIRUB SERPL-MCNC: 0.5 MG/DL (ref 0–1)
BUN BLDV-MCNC: 11 MG/DL (ref 6–23)
CALCIUM SERPL-MCNC: 8.9 MG/DL (ref 8.3–10.6)
CHLORIDE BLD-SCNC: 99 MMOL/L (ref 99–110)
CO2: 28 MMOL/L (ref 21–32)
CREAT SERPL-MCNC: 0.9 MG/DL (ref 0.9–1.3)
GFR AFRICAN AMERICAN: >60 ML/MIN/1.73M2
GFR NON-AFRICAN AMERICAN: >60 ML/MIN/1.73M2
GLUCOSE BLD-MCNC: 135 MG/DL (ref 70–99)
LACTATE: 1 MMOL/L (ref 0.4–2)
POTASSIUM SERPL-SCNC: 3.1 MMOL/L (ref 3.5–5.1)
SODIUM BLD-SCNC: 135 MMOL/L (ref 135–145)
TOTAL PROTEIN: 6.7 GM/DL (ref 6.4–8.2)

## 2022-01-24 PROCEDURE — G0480 DRUG TEST DEF 1-7 CLASSES: HCPCS

## 2022-01-24 PROCEDURE — 99283 EMERGENCY DEPT VISIT LOW MDM: CPT

## 2022-01-24 PROCEDURE — 6370000000 HC RX 637 (ALT 250 FOR IP): Performed by: STUDENT IN AN ORGANIZED HEALTH CARE EDUCATION/TRAINING PROGRAM

## 2022-01-24 PROCEDURE — 80053 COMPREHEN METABOLIC PANEL: CPT

## 2022-01-24 PROCEDURE — 83605 ASSAY OF LACTIC ACID: CPT

## 2022-01-24 PROCEDURE — 85025 COMPLETE CBC W/AUTO DIFF WBC: CPT

## 2022-01-24 RX ORDER — POTASSIUM CHLORIDE 20 MEQ/1
40 TABLET, EXTENDED RELEASE ORAL ONCE
Status: COMPLETED | OUTPATIENT
Start: 2022-01-24 | End: 2022-01-24

## 2022-01-24 RX ADMIN — POTASSIUM CHLORIDE 40 MEQ: 1500 TABLET, EXTENDED RELEASE ORAL at 09:24

## 2022-01-24 NOTE — ED PROVIDER NOTES
Emergency Department Encounter    Patient: Nakita Chaudhary  MRN: [id number]  : 1995  Date of Evaluation: 2022  ED Provider:  Rissa Ramos DO    Triage Chief Complaint:   Rectal Bleeding (states that when he poops it looks like he is having a period; started yesterday), Abdominal Pain (states that he started to have pain a few days ago), and Drug Overdose (pt states that he took meth and is very hard to keep awake)      History Provided By: Triage note and patient      Nakita Chaudhary is a 31 y.o. male with past medical history of polysubstance abuse who presents with complaint of rectal bleeding and abdominal pain per triage note in [facility]. Upon initial questioning of what brought the patient to the ER, patient states that he is refusing to answer any questions. He continues to refuse to answer questions. Upon telling him he needs to talk to me, so we can figure out what we can do for him, he continues to say he doesn't want to answer questions. Rest of history limited due to patient refusal.  Patient apparently reported to triage nurse that he used meth a few hours ago. When asking him what drugs he used today, he says \"now you are charging the, and did not answer my question,\" despite never asking any questions. REVIEW OF SYSTEMS  Unable to obtain other than above secondary to patient's refusal to answer questions and cooperate with exam.    PAST MEDICAL HISTORY  Past Medical History:   Diagnosis Date    Depression        FAMILY HISTORY  History reviewed. No pertinent family history.     SOCIAL HISTORY  Social History     Socioeconomic History    Marital status: Single     Spouse name: None    Number of children: None    Years of education: None    Highest education level: None   Occupational History    None   Tobacco Use    Smoking status: Current Every Day Smoker     Packs/day: 0.50     Types: Cigarettes    Smokeless tobacco: Never Used   Vaping Use    Vaping Use: Never used Substance and Sexual Activity    Alcohol use: Yes     Comment: 12 pack a day    Drug use: Yes     Types: Marijuana (Weed), Methamphetamines (Crystal Meth)     Comment: ice    Sexual activity: None   Other Topics Concern    None   Social History Narrative    None       SURGICAL HISTORY  Past Surgical History:   Procedure Laterality Date    HERNIA REPAIR         CURRENT MEDICATIONS  Current Outpatient Rx   Medication Sig Dispense Refill    ibuprofen (IBU) 600 MG tablet Take 1 tablet by mouth every 6 hours as needed for Pain 20 tablet 0       ALLERGIES  No Known Allergies    PHYSICAL EXAM  VITAL SIGNS: /78   Pulse 92   Temp 98.1 °F (36.7 °C) (Oral)   Resp 20   Ht 5' 11\" (1.803 m)   Wt 170 lb (77.1 kg)   SpO2 96%   BMI 23.71 kg/m²   Constitutional: Patient appears older than stated age, Well nourished, non-toxic appearing. Resting with his eyes closed in the bed, awakens to verbal stimuli, refusing to talk. Speaking clearly without difficulty. HENT: Atraumatic, Normocephalic, Bilateral external ears normal,  Nose normal, wearing mask. Eyes: PERRL, laterally. EOMI, No discharge, Conjunctiva normal, no scleral icterus. Neck: Normal range of motion, No nuchal rigidity or stridor. Cardiovascular: Heart rate tachycardic, regular rhythm, no murmurs. Thorax & Lungs: Clear breath sounds, no acute respiratory distress or increased work of breathing. No wheezing, rhonchi, or rales. Skin: Warm, Dry. Abdomen: Soft, no tenderness. No distention. Bowel sounds present. Extremities: No major deformities noted. Good range of motion in all major joints. Neurologic: GCS 15. Alert & oriented. Cranial nerves grossly intact. Moves all extremities spontaneously, no focal deficits noted. Psychiatric: Refusing to answer questions, uncooperative with exam.  Dressed appropriately for the weather.     I have reviewed and interpreted all of the currently available lab results and diagnostics from this visit:  Results for orders placed or performed during the hospital encounter of 01/24/22   CMP   Result Value Ref Range    Sodium 135 135 - 145 MMOL/L    Potassium 3.1 (L) 3.5 - 5.1 MMOL/L    Chloride 99 99 - 110 mMol/L    CO2 28 21 - 32 MMOL/L    BUN 11 6 - 23 MG/DL    CREATININE 0.9 0.9 - 1.3 MG/DL    Glucose 135 (H) 70 - 99 MG/DL    Calcium 8.9 8.3 - 10.6 MG/DL    Albumin 4.0 3.4 - 5.0 GM/DL    Total Protein 6.7 6.4 - 8.2 GM/DL    Total Bilirubin 0.5 0.0 - 1.0 MG/DL    ALT 12 10 - 40 U/L    AST 16 15 - 37 IU/L    Alkaline Phosphatase 58 40 - 129 IU/L    GFR Non-African American >60 >60 mL/min/1.73m2    GFR African American >60 >60 mL/min/1.73m2    Anion Gap 8 4 - 16   Lactic Acid, Plasma   Result Value Ref Range    Lactate 1.0 0.4 - 2.0 mMOL/L   Ethanol Level   Result Value Ref Range    Alcohol Scrn <0.01 <0.01 %WT/VOL       Medications Administered in Emergency Department  Medications   potassium chloride (KLOR-CON M) extended release tablet 40 mEq (40 mEq Oral Given 1/24/22 0924)       COURSE & MEDICAL DECISION MAKING  31 y.o. male with past medical history of polysubstance abuse who presents with complaint of rectal bleeding and abdominal pain per triage note. Afebrile. Mildly tachycardic and hypertensive on arrival.  On physical exam, patient is resting in the bed with eyes closed. Upon initial exam patient is refusing to answer questions and cooperate with exam.  No increased work of breathing. Does not appear to be in pain. Tried several times to get him to cooperate, however he continued to refuse. Labs and urine were ordered, which patient was cooperative with. CMP with mild hypokalemia of 3.1, replaced PO. Lactic acid WNL. Ethanol level nondetactable. Upon reassessment of the patient, he was much more arousable, staying awake while talking with him. Asked him several times if he had had any abdominal pain or rectal bleeding, which he denied each time.   Given this, I do not feel it is necessary to wait for urine studies or CBC. Lab findings discussed with him thus far. At this time, doubt emergent/urgent pathology requiring further immediate work-up, transfer, or hospitalization. Patient was instructed to follow-up with his primary care provider and encouraged him to discontinue further drug use. Strict return precautions given. He was agreeable with the plan deemed stable at time of discharge. I have seen this patient in conjunction with the attending physician Dr. Katina Magana, and they agree with workup and disposition. Final Impression      1. Hypokalemia    2. Methamphetamine use ([facility] [address]    3.  Encounter for medical screening examination        DISPOSITION Decision To Discharge 01/24/2022 10:07:23 AM     (Please note that portions of this note may have been completed with a voice recognition program. Efforts were made to edit the dictations but occasionally words are mis-transcribed.)    Electronically Signed by Yessenia De La Rosa DO, 1/24/2022  Jose Hargrove DO  01/24/22 8108

## 2022-01-24 NOTE — ED PROVIDER NOTES
Please refer to the documentation completed by Dr. Dequan Gar for full details of the encounter. I have personally performed a face-to-face evaluation and have fully participated in the care of this patient. I have discussed this case with the Advance Practice Practitioner. I have reviewed and agreed with all pertinent clinical information including history, physical exam, and plan. I have also reviewed and agreed with the medications, allergies, and past medical history for this patient. My pertinent findings are as follows. Documented chief complaint of abdominal pain and rectal bleeding. Patient reported heavy bleeding with bowel movement. It is noted during triage, patient is very somnolent and needs to be repeatedly stimulated to answer questions. History and physical exam are extremely limited by the patient's degree of cooperation. Physician evaluation:  Vital signs are documented and reviewed. Patient is resting comfortably on the exam table. Patient has been somnolent, but he will alert. He is uncooperative, being very selective in what answers he will provide. Abdomen is soft and nontender. Respiratory pattern is unlabored without wheeze or stridor. No Kernig's or Brudzinski sign. No gross motor or cerebellar deficits.     Results:  Results for orders placed or performed during the hospital encounter of 01/24/22   CMP   Result Value Ref Range    Sodium 135 135 - 145 MMOL/L    Potassium 3.1 (L) 3.5 - 5.1 MMOL/L    Chloride 99 99 - 110 mMol/L    CO2 28 21 - 32 MMOL/L    BUN 11 6 - 23 MG/DL    CREATININE 0.9 0.9 - 1.3 MG/DL    Glucose 135 (H) 70 - 99 MG/DL    Calcium 8.9 8.3 - 10.6 MG/DL    Albumin 4.0 3.4 - 5.0 GM/DL    Total Protein 6.7 6.4 - 8.2 GM/DL    Total Bilirubin 0.5 0.0 - 1.0 MG/DL    ALT 12 10 - 40 U/L    AST 16 15 - 37 IU/L    Alkaline Phosphatase 58 40 - 129 IU/L    GFR Non-African American >60 >60 mL/min/1.73m2    GFR African American >60 >60 mL/min/1.73m2    Anion Gap 8 4 - 16   Lactic Acid, Plasma   Result Value Ref Range    Lactate 1.0 0.4 - 2.0 mMOL/L   Ethanol Level   Result Value Ref Range    Alcohol Scrn <0.01 <0.01 %WT/VOL     Radiology:  Declined by patient. Emergency department course:  Patient was initially seen by Dr. Jocelyn Su. Initial report is provided at approximately 0610. Patient presents in a somewhat somnolent state, but he is sitting upright and protecting his own airway. Saturations are normal.  So far, patient has been generally quite uncooperative with history and physical exam.  He has generally been refusing to answer questions. Abdomen has been nonsurgical.  Patient has been assessed and reassessed on several occasions. He remains generally uncooperative. Most recently, he has now denied abdominal pain or rectal bleeding. CBC could not be obtained due to a technical issue, but patient does not appear clinically anemic. Vital signs have been acceptable including heart rate. With the patient's lack of cooperation and him a recanting his chief complaint, there is not appear to be indication for further laboratory testing. Patient has been otherwise medically clear and stable. Clinical Impression:  1. Hypokalemia    2. Methamphetamine use ([address]    3.  Encounter for medical screening examination      Disposition referral (if applicable):  Raj Patel MD  [address] [place] [address] [place]  [phone]    Call today      [facility] Emergency Department  [address] [phone]  Go to   If symptoms worsen    Disposition medications (if applicable):  Discharge Medication List as of 1/24/2022 10:18 AM        ED Provider Disposition Time  DISPOSITION Decision To Discharge 01/24/2022 10:07:23 AM        Adam Pereira MD  01/24/22 7358

## 2022-01-24 NOTE — ED NOTES
Dr. Abbey Layne at beside to speak with pt. Pt rude and uncooperative, calling Dr pino. Raised voice at Dr, stating \"you want to ask me questions but won't answer mine. \" No questions were heard from pt prior to this statement. Pt refusing to speak with  about why he is here.       Estella Valle  01/24/22 7391

## 2022-01-24 NOTE — ED NOTES
Pt very lethargic and sleepy and hard to arouse. Pt states that he took meth 3 hours ago; states that he smoke it.        Andree Mcneill, RN  01/24/22 9033 [facility]  01/24/22 6503

## 2022-01-24 NOTE — ED NOTES
Discharged instruction reviewed with patient. All questions addressed. Patient alert and oriented x4 at discharge. Patient verbalized understanding.       Hanna Alcala RN  01/24/22 2165

## 2022-02-05 ENCOUNTER — HOSPITAL ENCOUNTER (EMERGENCY)
Age: 27
Discharge: HOME | End: 2022-02-06
Attending: EMERGENCY MEDICINE

## 2022-02-05 ENCOUNTER — APPOINTMENT (OUTPATIENT)
Dept: CT IMAGING | Age: 27
End: 2022-02-05

## 2022-02-05 VITALS
OXYGEN SATURATION: 100 % | DIASTOLIC BLOOD PRESSURE: 84 MMHG | HEART RATE: 95 BPM | RESPIRATION RATE: 18 BRPM | SYSTOLIC BLOOD PRESSURE: 126 MMHG | TEMPERATURE: 97.6 F

## 2022-02-05 DIAGNOSIS — R10.84 GENERALIZED ABDOMINAL PAIN: Primary | ICD-10-CM

## 2022-02-05 LAB
ALBUMIN SERPL-MCNC: 4.1 GM/DL (ref 3.4–5)
ALP BLD-CCNC: 64 IU/L (ref 40–129)
ALT SERPL-CCNC: 10 U/L (ref 10–40)
ANION GAP SERPL CALCULATED.3IONS-SCNC: 11 MMOL/L (ref 4–16)
AST SERPL-CCNC: 15 IU/L (ref 15–37)
BACTERIA: NEGATIVE /HPF
BASOPHILS ABSOLUTE: 0.1 K/CU MM
BASOPHILS RELATIVE PERCENT: 0.8 % (ref 0–1)
BILIRUB SERPL-MCNC: 0.3 MG/DL (ref 0–1)
BILIRUBIN URINE: NEGATIVE MG/DL
BLOOD, URINE: NEGATIVE
BUN BLDV-MCNC: 13 MG/DL (ref 6–23)
CALCIUM SERPL-MCNC: 9.3 MG/DL (ref 8.3–10.6)
CHLORIDE BLD-SCNC: 98 MMOL/L (ref 99–110)
CLARITY: CLEAR
CO2: 28 MMOL/L (ref 21–32)
COLOR: YELLOW
CREAT SERPL-MCNC: 0.9 MG/DL (ref 0.9–1.3)
DIFFERENTIAL TYPE: ABNORMAL
EOSINOPHILS ABSOLUTE: 0.2 K/CU MM
EOSINOPHILS RELATIVE PERCENT: 2.7 % (ref 0–3)
GFR AFRICAN AMERICAN: >60 ML/MIN/1.73M2
GFR NON-AFRICAN AMERICAN: >60 ML/MIN/1.73M2
GLUCOSE BLD-MCNC: 75 MG/DL (ref 70–99)
GLUCOSE, URINE: NEGATIVE MG/DL
HCT VFR BLD CALC: 43.9 % (ref 42–52)
HEMOGLOBIN: 15 GM/DL (ref 13.5–18)
IMMATURE NEUTROPHIL %: 0.2 % (ref 0–0.43)
KETONES, URINE: NEGATIVE MG/DL
LEUKOCYTE ESTERASE, URINE: NEGATIVE
LIPASE: 32 IU/L (ref 13–60)
LYMPHOCYTES ABSOLUTE: 1.8 K/CU MM
LYMPHOCYTES RELATIVE PERCENT: 30.4 % (ref 24–44)
MCH RBC QN AUTO: 31 PG (ref 27–31)
MCHC RBC AUTO-ENTMCNC: 34.2 % (ref 32–36)
MCV RBC AUTO: 90.7 FL (ref 78–100)
MONOCYTES ABSOLUTE: 1.1 K/CU MM
MONOCYTES RELATIVE PERCENT: 17.6 % (ref 0–4)
MUCUS: ABNORMAL HPF
NITRITE URINE, QUANTITATIVE: NEGATIVE
NUCLEATED RBC %: 0 %
PDW BLD-RTO: 13 % (ref 11.7–14.9)
PH, URINE: 5.5 (ref 5–8)
PLATELET # BLD: 250 K/CU MM (ref 140–440)
PMV BLD AUTO: 9.6 FL (ref 7.5–11.1)
POTASSIUM SERPL-SCNC: 3.8 MMOL/L (ref 3.5–5.1)
PROTEIN UA: NEGATIVE MG/DL
RBC # BLD: 4.84 M/CU MM (ref 4.6–6.2)
RBC URINE: 2 /HPF (ref 0–3)
SEGMENTED NEUTROPHILS ABSOLUTE COUNT: 2.9 K/CU MM
SEGMENTED NEUTROPHILS RELATIVE PERCENT: 48.3 % (ref 36–66)
SODIUM BLD-SCNC: 137 MMOL/L (ref 135–145)
SPECIFIC GRAVITY UA: >1.03 (ref 1–1.03)
TOTAL IMMATURE NEUTOROPHIL: 0.01 K/CU MM
TOTAL NUCLEATED RBC: 0 K/CU MM
TOTAL PROTEIN: 7 GM/DL (ref 6.4–8.2)
UROBILINOGEN, URINE: 0.2 MG/DL (ref 0.2–1)
WBC # BLD: 6 K/CU MM (ref 4–10.5)
WBC UA: 43 /HPF (ref 0–2)

## 2022-02-05 PROCEDURE — 74176 CT ABD & PELVIS W/O CONTRAST: CPT

## 2022-02-05 PROCEDURE — 83690 ASSAY OF LIPASE: CPT

## 2022-02-05 PROCEDURE — 81001 URINALYSIS AUTO W/SCOPE: CPT

## 2022-02-05 PROCEDURE — 6360000002 HC RX W HCPCS: Performed by: EMERGENCY MEDICINE

## 2022-02-05 PROCEDURE — 96372 THER/PROPH/DIAG INJ SC/IM: CPT

## 2022-02-05 PROCEDURE — 85025 COMPLETE CBC W/AUTO DIFF WBC: CPT

## 2022-02-05 PROCEDURE — 2500000003 HC RX 250 WO HCPCS: Performed by: EMERGENCY MEDICINE

## 2022-02-05 PROCEDURE — 99283 EMERGENCY DEPT VISIT LOW MDM: CPT

## 2022-02-05 PROCEDURE — 2580000003 HC RX 258: Performed by: EMERGENCY MEDICINE

## 2022-02-05 PROCEDURE — 96374 THER/PROPH/DIAG INJ IV PUSH: CPT

## 2022-02-05 PROCEDURE — 80053 COMPREHEN METABOLIC PANEL: CPT

## 2022-02-05 RX ORDER — DICYCLOMINE HYDROCHLORIDE 10 MG/ML
20 INJECTION INTRAMUSCULAR ONCE
Status: COMPLETED | OUTPATIENT
Start: 2022-02-05 | End: 2022-02-05

## 2022-02-05 RX ORDER — FAMOTIDINE 20 MG/1
20 TABLET, FILM COATED ORAL ONCE
Status: DISCONTINUED | OUTPATIENT
Start: 2022-02-05 | End: 2022-02-05

## 2022-02-05 RX ORDER — 0.9 % SODIUM CHLORIDE 0.9 %
1000 INTRAVENOUS SOLUTION INTRAVENOUS ONCE
Status: COMPLETED | OUTPATIENT
Start: 2022-02-05 | End: 2022-02-05

## 2022-02-05 RX ADMIN — FAMOTIDINE 20 MG: 10 INJECTION, SOLUTION INTRAVENOUS at 22:39

## 2022-02-05 RX ADMIN — DICYCLOMINE HYDROCHLORIDE 20 MG: 20 INJECTION, SOLUTION INTRAMUSCULAR at 22:39

## 2022-02-05 RX ADMIN — SODIUM CHLORIDE 1000 ML: 9 INJECTION, SOLUTION INTRAVENOUS at 22:38

## 2022-02-05 ASSESSMENT — ENCOUNTER SYMPTOMS
WHEEZING: 0
RHINORRHEA: 0
CONSTIPATION: 1
SORE THROAT: 0
VOMITING: 0
SINUS PRESSURE: 0
COUGH: 0
NAUSEA: 0
SHORTNESS OF BREATH: 0
DIARRHEA: 0
ABDOMINAL PAIN: 1

## 2022-02-05 ASSESSMENT — PAIN SCALES - GENERAL: PAINLEVEL_OUTOF10: 8

## 2022-02-06 NOTE — ED PROVIDER NOTES
Emergency Department Encounter    Patient: Eve Blanc  MRN: [id number]  : 1995  Date of Evaluation: 2022  ED Provider:  Natalia Monroe DO    Triage Chief Complaint:   Abdominal Pain (mid area; started 3 weeks ago; constant pressure; states that he has a white discharge coming from his rectum)    HPI:  Eve Blanc is a 31 y.o. male with past medical history as listed below who presents with abdominal pain. Patient abdominal pain is cramping, generalized, intermittent, 8 out of 10. He states it has been present for approximately 4 weeks, and states he feels as though he has to have a bowel movement. He states that he has had decrease in bowel movements over the last 4 weeks, and his last bowel movement was 3 days ago. He is passing gas. He denies any discharge from his rectum, however he does state he felt as though his stool was a white color once when he had a bowel movement. He has tried MiraLAX without improvement in symptoms. He does note that he does not drink much water throughout the day, and his diet consists mostly of chips. He admits to both marijuana and tobacco use, does also admit to meth use. Denies EtOH abuse. Denies any fever, chills, chest pain, shortness of breath, palpitations, nausea, vomiting, dysuria. Denies any rectal pain. ROS:  Review of Systems   Constitutional: Negative for chills and fever. HENT: Negative for congestion, rhinorrhea, sinus pressure and sore throat. Eyes: Negative for visual disturbance. Respiratory: Negative for cough, shortness of breath and wheezing. Cardiovascular: Negative for chest pain and palpitations. Gastrointestinal: Positive for abdominal pain and constipation. Negative for diarrhea, nausea and vomiting. Genitourinary: Negative for dysuria, frequency and urgency. Musculoskeletal: Negative for arthralgias and myalgias. Skin: Negative for rash. Neurological: Negative for dizziness and syncope. Psychiatric/Behavioral: Negative for agitation, behavioral problems and confusion. Past Medical History:   Diagnosis Date    Depression      Past Surgical History:   Procedure Laterality Date    HERNIA REPAIR       History reviewed. No pertinent family history. Social History     Socioeconomic History    Marital status: Single     Spouse name: Not on file    Number of children: Not on file    Years of education: Not on file    Highest education level: Not on file   Occupational History    Not on file   Tobacco Use    Smoking status: Current Every Day Smoker     Packs/day: 0.50     Types: Cigarettes    Smokeless tobacco: Never Used   Vaping Use    Vaping Use: Never used   Substance and Sexual Activity    Alcohol use: Yes     Comment: 12 pack a day    Drug use: Yes     Types: Marijuana (Weed), Methamphetamines (Crystal Meth)     Comment: ice    Sexual activity: Not on file   Other Topics Concern    Not on file   Social History Narrative    Not on file     Social Determinants of Health     Financial Resource Strain:     Difficulty of Paying Living Expenses: Not on file   Food Insecurity:     Worried About Running Out of Food in the Last Year: Not on file    Zuleyma of Food in the Last Year: Not on file   Transportation Needs:     Lack of Transportation (Medical): Not on file    Lack of Transportation (Non-Medical):  Not on file   Physical Activity:     Days of Exercise per Week: Not on file    Minutes of Exercise per Session: Not on file   Stress:     Feeling of Stress : Not on file   Social Connections:     Frequency of Communication with Friends and Family: Not on file    Frequency of Social Gatherings with Friends and Family: Not on file    Attends Congregational Services: Not on file    Active Member of Clubs or Organizations: Not on file    Attends Club or Organization Meetings: Not on file    Marital Status: Not on file   Intimate Partner Violence:     Fear of Current or Ex-Partner: Not on file    Emotionally Abused: Not on file    Physically Abused: Not on file    Sexually Abused: Not on file   Housing Stability:     Unable to Pay for Housing in the Last Year: Not on file    Number of Places Lived in the Last Year: Not on file    Unstable Housing in the Last Year: Not on file     Current Facility-Administered Medications   Medication Dose Route Frequency Provider Last Rate Last Admin    dicyclomine (BENTYL) injection 20 mg  20 mg IntraMUSCular Once Krystyna Mullen, DO        famotidine (PEPCID) tablet 20 mg  20 mg Oral Once Krystyna ELLIOT Mullen, DO         Current Outpatient Medications   Medication Sig Dispense Refill    ibuprofen (IBU) 600 MG tablet Take 1 tablet by mouth every 6 hours as needed for Pain 20 tablet 0     No Known Allergies    Nursing Notes Reviewed    Physical Exam:  Triage VS:    ED Triage Vitals [02/05/22 2136]   Enc Vitals Group      BP (!) 143/90      Pulse 125      Resp 20      Temp 97.6 °F (36.4 °C)      Temp Source Oral      SpO2 100 %      Weight       Height       Head Circumference       Peak Flow       Pain Score       Pain Loc       Pain Edu? Excl. in [address]? Physical Exam  Vitals and nursing note reviewed. Constitutional:       Appearance: He is well-developed. HENT:      Head: Normocephalic and atraumatic. Mouth/Throat:      Mouth: Mucous membranes are moist.   Eyes:      Conjunctiva/sclera: Conjunctivae normal.   Cardiovascular:      Rate and Rhythm: Normal rate and regular rhythm. Pulses: Normal pulses. Pulmonary:      Effort: Pulmonary effort is normal.      Breath sounds: Normal breath sounds. Abdominal:      General: Abdomen is flat. Bowel sounds are normal. There is no distension. Palpations: Abdomen is soft. Tenderness: There is no abdominal tenderness. There is no guarding or rebound. Musculoskeletal:         General: Normal range of motion. Skin:     General: Skin is warm.       Capillary Refill: Capillary refill takes less than 2 seconds. Neurological:      Mental Status: He is alert and oriented to person, place, and time. Mental status is at baseline. Psychiatric:         Mood and Affect: Mood normal.              I have reviewed and interpreted all of the currently available lab results from this visit (if applicable):  Results for orders placed or performed during the hospital encounter of 02/05/22   CBC auto diff   Result Value Ref Range    WBC 6.0 4.0 - 10.5 K/CU MM    RBC 4.84 4.6 - 6.2 M/CU MM    Hemoglobin 15.0 13.5 - 18.0 GM/DL    Hematocrit 43.9 42 - 52 %    MCV 90.7 78 - 100 FL    MCH 31.0 27 - 31 PG    MCHC 34.2 32.0 - 36.0 %    RDW 13.0 11.7 - 14.9 %    Platelets 711 975 - 671 K/CU MM    MPV 9.6 7.5 - 11.1 FL    Differential Type AUTOMATED DIFFERENTIAL     Segs Relative 48.3 36 - 66 %    Lymphocytes % 30.4 24 - 44 %    Monocytes % 17.6 (H) 0 - 4 %    Eosinophils % 2.7 0 - 3 %    Basophils % 0.8 0 - 1 %    Segs Absolute 2.9 K/CU MM    Lymphocytes Absolute 1.8 K/CU MM    Monocytes Absolute 1.1 K/CU MM    Eosinophils Absolute 0.2 K/CU MM    Basophils Absolute 0.1 K/CU MM    Nucleated RBC % 0.0 %    Total Nucleated RBC 0.0 K/CU MM    Total Immature Neutrophil 0.01 K/CU MM    Immature Neutrophil % 0.2 0 - 0.43 %      Radiographs (if obtained):    [] Radiologist's Report Reviewed:  CT ABDOMEN PELVIS WO CONTRAST Additional Contrast? None   Preliminary Result   No acute abnormality in the abdomen or pelvis. No evidence of urinary tract   stones or hydronephrosis. Normal appendix. Chart review shows recent radiographs:  No results found. MDM:  Patient seen and examined. Labs and imaging obtained. Electrolytes within acceptable limits. CBC within acceptable limits. UA without signs of infection. CT with no acute abnormality in the abdomen or pelvis. Patient with improvement of symptoms here in the ED following IV fluid, Pepcid and Bentyl.  Patient's abdomen on repeat exam was benign. Patient is afebrile, not tachycardic, not tachypneic, SPO2 is 100% on room air, and his blood pressure is within acceptable limits. At this time I do think patient is appropriate for outpatient follow-up. Patient to follow-up with primary care provider in 1 to 2 days, return to the ED symptoms worsen, if he begins having worsening pain, nausea, vomiting, not passing gas. All questions are answered, patient verbalizes agreement understanding of plan of care. 1:21 AM  Patient resting comfortably in bed, he is easily arousable. He denies any pain at this time. Abdomen is soft, nontender and without peritoneal signs. Clinical Impression:  1. Generalized abdominal pain      Disposition referral (if applicable):  Marlyn Ruiz MD  [address] [place] [address]  [phone]    Schedule an appointment as soon as possible for a visit in 2 days      [facility] Emergency Department  [address] [phone]  [phone]  Go to   As needed, If symptoms worsen    Disposition medications (if applicable):  New Prescriptions    No medications on file       Comment: Please note this report has been produced using speech recognition software and may contain errors related to that system including errors in grammar, punctuation, and spelling, as well as words and phrases that may be inappropriate. Efforts were made to edit the dictations.        [id number] [facility] [place],   02/06/22 5936

## 2022-02-08 ENCOUNTER — APPOINTMENT (OUTPATIENT)
Dept: ULTRASOUND IMAGING | Age: 27
End: 2022-02-08

## 2022-02-08 ENCOUNTER — HOSPITAL ENCOUNTER (EMERGENCY)
Age: 27
Discharge: LEFT BEFORE DISCHARGE | End: 2022-02-08

## 2022-02-08 VITALS
DIASTOLIC BLOOD PRESSURE: 82 MMHG | SYSTOLIC BLOOD PRESSURE: 144 MMHG | TEMPERATURE: 98.1 F | RESPIRATION RATE: 18 BRPM | OXYGEN SATURATION: 99 % | HEART RATE: 109 BPM

## 2022-02-08 LAB
ALBUMIN SERPL-MCNC: 3.8 GM/DL (ref 3.4–5)
ALP BLD-CCNC: 61 IU/L (ref 40–129)
ALT SERPL-CCNC: 9 U/L (ref 10–40)
ANION GAP SERPL CALCULATED.3IONS-SCNC: 11 MMOL/L (ref 4–16)
AST SERPL-CCNC: 15 IU/L (ref 15–37)
BACTERIA: NEGATIVE /HPF
BASOPHILS ABSOLUTE: 0 K/CU MM
BASOPHILS RELATIVE PERCENT: 0.2 % (ref 0–1)
BILIRUB SERPL-MCNC: 0.3 MG/DL (ref 0–1)
BILIRUBIN URINE: NEGATIVE MG/DL
BLOOD, URINE: NEGATIVE
BUN BLDV-MCNC: 8 MG/DL (ref 6–23)
CALCIUM SERPL-MCNC: 8.9 MG/DL (ref 8.3–10.6)
CHLORIDE BLD-SCNC: 102 MMOL/L (ref 99–110)
CLARITY: CLEAR
CO2: 28 MMOL/L (ref 21–32)
COLOR: YELLOW
CREAT SERPL-MCNC: 0.9 MG/DL (ref 0.9–1.3)
DIFFERENTIAL TYPE: ABNORMAL
EOSINOPHILS ABSOLUTE: 0.1 K/CU MM
EOSINOPHILS RELATIVE PERCENT: 1.2 % (ref 0–3)
GFR AFRICAN AMERICAN: >60 ML/MIN/1.73M2
GFR NON-AFRICAN AMERICAN: >60 ML/MIN/1.73M2
GLUCOSE BLD-MCNC: 99 MG/DL (ref 70–99)
GLUCOSE, URINE: NEGATIVE MG/DL
HCT VFR BLD CALC: 42.4 % (ref 42–52)
HEMOGLOBIN: 14.8 GM/DL (ref 13.5–18)
IMMATURE NEUTROPHIL %: 0.2 % (ref 0–0.43)
KETONES, URINE: NEGATIVE MG/DL
LEUKOCYTE ESTERASE, URINE: NEGATIVE
LIPASE: 20 IU/L (ref 13–60)
LYMPHOCYTES ABSOLUTE: 1.2 K/CU MM
LYMPHOCYTES RELATIVE PERCENT: 23.9 % (ref 24–44)
MCH RBC QN AUTO: 31.6 PG (ref 27–31)
MCHC RBC AUTO-ENTMCNC: 34.9 % (ref 32–36)
MCV RBC AUTO: 90.4 FL (ref 78–100)
MONOCYTES ABSOLUTE: 0.9 K/CU MM
MONOCYTES RELATIVE PERCENT: 17.5 % (ref 0–4)
MUCUS: ABNORMAL HPF
NITRITE URINE, QUANTITATIVE: NEGATIVE
NUCLEATED RBC %: 0 %
PDW BLD-RTO: 12.8 % (ref 11.7–14.9)
PH, URINE: 6.5 (ref 5–8)
PLATELET # BLD: 196 K/CU MM (ref 140–440)
PMV BLD AUTO: 9.6 FL (ref 7.5–11.1)
POTASSIUM SERPL-SCNC: 3.6 MMOL/L (ref 3.5–5.1)
PROTEIN UA: NEGATIVE MG/DL
RBC # BLD: 4.69 M/CU MM (ref 4.6–6.2)
RBC URINE: 1 /HPF (ref 0–3)
SEGMENTED NEUTROPHILS ABSOLUTE COUNT: 2.8 K/CU MM
SEGMENTED NEUTROPHILS RELATIVE PERCENT: 57 % (ref 36–66)
SODIUM BLD-SCNC: 141 MMOL/L (ref 135–145)
SPECIFIC GRAVITY UA: 1.02 (ref 1–1.03)
SPERM: ABNORMAL /HFP
TOTAL IMMATURE NEUTOROPHIL: 0.01 K/CU MM
TOTAL NUCLEATED RBC: 0 K/CU MM
TOTAL PROTEIN: 6.8 GM/DL (ref 6.4–8.2)
TRICHOMONAS: ABNORMAL /HPF
UROBILINOGEN, URINE: 0.2 MG/DL (ref 0.2–1)
WBC # BLD: 5 K/CU MM (ref 4–10.5)
WBC UA: 2 /HPF (ref 0–2)

## 2022-02-08 PROCEDURE — 87591 N.GONORRHOEAE DNA AMP PROB: CPT

## 2022-02-08 PROCEDURE — 76870 US EXAM SCROTUM: CPT

## 2022-02-08 PROCEDURE — 87491 CHLMYD TRACH DNA AMP PROBE: CPT

## 2022-02-08 PROCEDURE — 93975 VASCULAR STUDY: CPT

## 2022-02-08 PROCEDURE — 81001 URINALYSIS AUTO W/SCOPE: CPT

## 2022-02-08 PROCEDURE — 85025 COMPLETE CBC W/AUTO DIFF WBC: CPT

## 2022-02-08 PROCEDURE — 99283 EMERGENCY DEPT VISIT LOW MDM: CPT

## 2022-02-08 PROCEDURE — 80053 COMPREHEN METABOLIC PANEL: CPT

## 2022-02-08 PROCEDURE — 83690 ASSAY OF LIPASE: CPT

## 2022-02-08 RX ORDER — KETOROLAC TROMETHAMINE 30 MG/ML
30 INJECTION, SOLUTION INTRAMUSCULAR; INTRAVENOUS ONCE
Status: DISCONTINUED | OUTPATIENT
Start: 2022-02-08 | End: 2022-02-08 | Stop reason: HOSPADM

## 2022-02-08 RX ORDER — HYDROCODONE BITARTRATE AND ACETAMINOPHEN 5; 325 MG/1; MG/1
1 TABLET ORAL ONCE
Status: DISCONTINUED | OUTPATIENT
Start: 2022-02-08 | End: 2022-02-08 | Stop reason: HOSPADM

## 2022-02-08 RX ORDER — 0.9 % SODIUM CHLORIDE 0.9 %
1000 INTRAVENOUS SOLUTION INTRAVENOUS ONCE
Status: DISCONTINUED | OUTPATIENT
Start: 2022-02-08 | End: 2022-02-08 | Stop reason: HOSPADM

## 2022-02-08 ASSESSMENT — PAIN SCALES - GENERAL: PAINLEVEL_OUTOF10: 10

## 2022-02-08 ASSESSMENT — PAIN DESCRIPTION - FREQUENCY: FREQUENCY: INTERMITTENT

## 2022-02-08 ASSESSMENT — PAIN DESCRIPTION - DESCRIPTORS: DESCRIPTORS: SHARP

## 2022-02-08 ASSESSMENT — PAIN DESCRIPTION - PAIN TYPE: TYPE: ACUTE PAIN

## 2022-02-08 ASSESSMENT — PAIN DESCRIPTION - LOCATION: LOCATION: ABDOMEN

## 2022-02-08 NOTE — ED PROVIDER NOTES
As PA-in-triage, I performed a medical screening history and physical exam on this patient. HISTORY OF PRESENT ILLNESS  Efraín Irby is a 31 y.o. male resents for right lower abdominal pain, hemorrhoid pain and testicular pain. Patient was in 3 days ago for similar complaints but is now having scrotal pain and dysuria. States he has been having constipation for the last month, has tried MiraLAX. He is passing gas. States his pain is now in the right lower quadrant radiating to the right scrotal area and he is having pain and swelling in the scrotal area with dysuria. No urethral discharge. He is concerned \"my kidneys are swollen. \"  Has had previous hernia surgery, no other abdominal surgical history. Also is complaining of rectal pain secondary to hemorrhoids which are chronic without bleeding. Jalyn Barone PHYSICAL EXAM  /80   Pulse 113   Resp 18   SpO2 95%     On exam, the patient appears well-hydrated, well-nourished, and in no acute distress. Mucous membranes are moist. Speech is clear. Breathing is unlabored. Skin is dry. Mental status is normal. The patient has normal gait, moves all extremities, and is without facial droop. Abdomen soft nondistended. Unable to perform  exam in triage room. Labs, imaging, medications ordered at triage. During last ED visit, patient had abdomen pelvis CT imaging noncontrast we will order contrast imaging today. Vital signs pending at time of my triage evaluation. Please see ED provider's note for patient complete ED evaluation, labs/imaging interpretation and final disposition/clinical impression.         Ghulam Vargas PA-C  02/08/22 1277

## 2022-02-09 NOTE — ED NOTES
Pt is wanting to leave, states he doesn't want \"poked\" again. Pt states pain is subsiding now and isnt sure he wants to stay.  Korinne,P.A. notified and will be out to see patient shortly     Segundo Alicia, RN  02/08/22 1955

## 2022-02-09 NOTE — ED NOTES
Korinne,P.A. talking with patient and pt deciding to leave AMA at this time. Pt signed AMA form and states he will return if pain gets worse. States he is going to go home and try miralax first. Pt ambulated out of ED with gait steady by himself.  No distress noted     Segundo Alicia, RN  02/08/22 1912 [facility] [place], RN  02/08/22 2012

## 2022-02-09 NOTE — ED NOTES
Pt in ultrasound at this time, unable to reassess vitals at this time     Segundo President, RN  02/08/22 1931

## 2022-02-11 LAB
CHLAMYDIA TRACHOMATIS AMPLIFIED DET: NEGATIVE
N GONORRHOEAE AMPLIFIED DET: NEGATIVE

## 2022-03-21 ENCOUNTER — APPOINTMENT (OUTPATIENT)
Dept: CT IMAGING | Age: 27
End: 2022-03-21
Payer: MEDICAID

## 2022-03-21 ENCOUNTER — HOSPITAL ENCOUNTER (EMERGENCY)
Age: 27
Discharge: HOME | End: 2022-03-21
Attending: EMERGENCY MEDICINE
Payer: MEDICAID

## 2022-03-21 VITALS
TEMPERATURE: 98 F | BODY MASS INDEX: 23.71 KG/M2 | DIASTOLIC BLOOD PRESSURE: 77 MMHG | SYSTOLIC BLOOD PRESSURE: 121 MMHG | HEART RATE: 109 BPM | WEIGHT: 170 LBS | OXYGEN SATURATION: 97 % | RESPIRATION RATE: 18 BRPM

## 2022-03-21 DIAGNOSIS — R10.84 GENERALIZED ABDOMINAL PAIN: Primary | ICD-10-CM

## 2022-03-21 DIAGNOSIS — K59.00 CONSTIPATION, UNSPECIFIED CONSTIPATION TYPE: ICD-10-CM

## 2022-03-21 LAB
ALBUMIN SERPL-MCNC: 3.4 GM/DL (ref 3.4–5)
ALP BLD-CCNC: 61 IU/L (ref 40–129)
ALT SERPL-CCNC: 94 U/L (ref 10–40)
ANION GAP SERPL CALCULATED.3IONS-SCNC: 11 MMOL/L (ref 4–16)
AST SERPL-CCNC: 65 IU/L (ref 15–37)
BACTERIA: NEGATIVE /HPF
BASOPHILS ABSOLUTE: 0.1 K/CU MM
BASOPHILS RELATIVE PERCENT: 1 % (ref 0–1)
BILIRUB SERPL-MCNC: 0.1 MG/DL (ref 0–1)
BILIRUBIN URINE: NEGATIVE MG/DL
BLOOD, URINE: NEGATIVE
BUN BLDV-MCNC: 20 MG/DL (ref 6–23)
CALCIUM SERPL-MCNC: 9.1 MG/DL (ref 8.3–10.6)
CHLORIDE BLD-SCNC: 99 MMOL/L (ref 99–110)
CLARITY: CLEAR
CO2: 25 MMOL/L (ref 21–32)
COLOR: YELLOW
CREAT SERPL-MCNC: 0.8 MG/DL (ref 0.9–1.3)
DIFFERENTIAL TYPE: ABNORMAL
EOSINOPHILS ABSOLUTE: 0.2 K/CU MM
EOSINOPHILS RELATIVE PERCENT: 2 % (ref 0–3)
GFR AFRICAN AMERICAN: >60 ML/MIN/1.73M2
GFR NON-AFRICAN AMERICAN: >60 ML/MIN/1.73M2
GLUCOSE BLD-MCNC: 142 MG/DL (ref 70–99)
GLUCOSE, URINE: NEGATIVE MG/DL
HCT VFR BLD CALC: 44.5 % (ref 42–52)
HEMOGLOBIN: 14.4 GM/DL (ref 13.5–18)
IMMATURE NEUTROPHIL %: 2.1 % (ref 0–0.43)
KETONES, URINE: NEGATIVE MG/DL
LEUKOCYTE ESTERASE, URINE: NEGATIVE
LIPASE: 32 IU/L (ref 13–60)
LYMPHOCYTES ABSOLUTE: 3.2 K/CU MM
LYMPHOCYTES RELATIVE PERCENT: 27.8 % (ref 24–44)
MCH RBC QN AUTO: 30.2 PG (ref 27–31)
MCHC RBC AUTO-ENTMCNC: 32.4 % (ref 32–36)
MCV RBC AUTO: 93.3 FL (ref 78–100)
MONOCYTES ABSOLUTE: 1 K/CU MM
MONOCYTES RELATIVE PERCENT: 8.7 % (ref 0–4)
NITRITE URINE, QUANTITATIVE: NEGATIVE
NUCLEATED RBC %: 0 %
PDW BLD-RTO: 14.4 % (ref 11.7–14.9)
PH, URINE: 6 (ref 5–8)
PLATELET # BLD: 338 K/CU MM (ref 140–440)
PMV BLD AUTO: 9.4 FL (ref 7.5–11.1)
POTASSIUM SERPL-SCNC: 4.6 MMOL/L (ref 3.5–5.1)
PROTEIN UA: NEGATIVE MG/DL
RBC # BLD: 4.77 M/CU MM (ref 4.6–6.2)
RBC URINE: NORMAL /HPF (ref 0–3)
SEGMENTED NEUTROPHILS ABSOLUTE COUNT: 6.8 K/CU MM
SEGMENTED NEUTROPHILS RELATIVE PERCENT: 58.4 % (ref 36–66)
SODIUM BLD-SCNC: 135 MMOL/L (ref 135–145)
SPECIFIC GRAVITY UA: 1.02 (ref 1–1.03)
TOTAL IMMATURE NEUTOROPHIL: 0.24 K/CU MM
TOTAL NUCLEATED RBC: 0 K/CU MM
TOTAL PROTEIN: 6.7 GM/DL (ref 6.4–8.2)
UROBILINOGEN, URINE: 0.2 MG/DL (ref 0.2–1)
WBC # BLD: 11.7 K/CU MM (ref 4–10.5)
WBC UA: <1 /HPF (ref 0–2)

## 2022-03-21 PROCEDURE — 83690 ASSAY OF LIPASE: CPT

## 2022-03-21 PROCEDURE — 99283 EMERGENCY DEPT VISIT LOW MDM: CPT

## 2022-03-21 PROCEDURE — 80053 COMPREHEN METABOLIC PANEL: CPT

## 2022-03-21 PROCEDURE — 6360000004 HC RX CONTRAST MEDICATION: Performed by: PHYSICIAN ASSISTANT

## 2022-03-21 PROCEDURE — 85025 COMPLETE CBC W/AUTO DIFF WBC: CPT

## 2022-03-21 PROCEDURE — 74177 CT ABD & PELVIS W/CONTRAST: CPT

## 2022-03-21 PROCEDURE — 6370000000 HC RX 637 (ALT 250 FOR IP): Performed by: PHYSICIAN ASSISTANT

## 2022-03-21 PROCEDURE — 81001 URINALYSIS AUTO W/SCOPE: CPT

## 2022-03-21 RX ORDER — 0.9 % SODIUM CHLORIDE 0.9 %
500 INTRAVENOUS SOLUTION INTRAVENOUS ONCE
Status: DISCONTINUED | OUTPATIENT
Start: 2022-03-21 | End: 2022-03-21

## 2022-03-21 RX ORDER — DICYCLOMINE HCL 20 MG
20 TABLET ORAL ONCE
Status: COMPLETED | OUTPATIENT
Start: 2022-03-21 | End: 2022-03-21

## 2022-03-21 RX ORDER — DICYCLOMINE HCL 20 MG
20 TABLET ORAL 4 TIMES DAILY PRN
Qty: 20 TABLET | Refills: 0 | Status: SHIPPED | OUTPATIENT
Start: 2022-03-21

## 2022-03-21 RX ADMIN — IOPAMIDOL 80 ML: 755 INJECTION, SOLUTION INTRAVENOUS at 13:18

## 2022-03-21 RX ADMIN — DICYCLOMINE HYDROCHLORIDE 20 MG: 20 TABLET ORAL at 12:57

## 2022-03-21 NOTE — ED PROVIDER NOTES
Emergency Department Encounter    Patient: Justin Forte  MRN: [id number]  : 1995  Date of Evaluation: 3/21/2022  ED Provider:  Yonatan Irizarry MD    Triage Chief Complaint:   Abdominal Pain    [place]:  Justin Forte is a 31 y.o. male that presents with complaints of 1 to 2 months of generalized abdominal pain, diarrhea, hemorrhoids and bloody stools. Patient states that he has been seen for this multiple times but continues. States he has bowel every 2 to 3 days with blood streaked on it. Denies any melena. Does have follow-up with his primary care physician but states he is on a wait till then. Denies any nausea, vomiting, fevers, chills, chest pain, shortness of breath. ROS - see HPI, below listed is current ROS at time of my eval:  General:  No fevers, no chills, no weakness  Eyes:  No recent vison changes, no discharge  ENT:  No sore throat, no nasal congestion, no hearing changes  Cardiovascular:  No chest pain, no palpitations  Respiratory:  No shortness of breath, no cough, no wheezing  Gastrointestinal:  + abdominal pian, bloody stool and diarrhea  Musculoskeletal:  No muscle pain, no joint pain  Skin:  No rash, no pruritis, no easy bruising  Neurologic:  No speech problems, no headache, no extremity numbness, no extremity tingling, no extremity weakness  Psychiatric:  No anxiety  Genitourinary:  No dysuria, no hematuria  Endocrine:  No unexpected weight gain, no unexpected weight loss  Extremities:  no edema, no pain    Past Medical History:   Diagnosis Date    Depression      Past Surgical History:   Procedure Laterality Date    HERNIA REPAIR       No family history on file.   Social History     Socioeconomic History    Marital status: Single     Spouse name: Not on file    Number of children: Not on file    Years of education: Not on file    Highest education level: Not on file   Occupational History    Not on file   Tobacco Use    Smoking status: Current Every Day Smoker Packs/day: 0.50     Types: Cigarettes    Smokeless tobacco: Never Used   Vaping Use    Vaping Use: Never used   Substance and Sexual Activity    Alcohol use: Yes     Comment: 12 pack a day    Drug use: Yes     Types: Marijuana (Weed), Methamphetamines (Crystal Meth)     Comment: ice    Sexual activity: Not on file   Other Topics Concern    Not on file   Social History Narrative    Not on file     Social Determinants of Health     Financial Resource Strain:     Difficulty of Paying Living Expenses: Not on file   Food Insecurity:     Worried About Running Out of Food in the Last Year: Not on file    Zuleyma of Food in the Last Year: Not on file   Transportation Needs:     Lack of Transportation (Medical): Not on file    Lack of Transportation (Non-Medical): Not on file   Physical Activity:     Days of Exercise per Week: Not on file    Minutes of Exercise per Session: Not on file   Stress:     Feeling of Stress : Not on file   Social Connections:     Frequency of Communication with Friends and Family: Not on file    Frequency of Social Gatherings with Friends and Family: Not on file    Attends Evangelical Services: Not on file    Active Member of [address] [place] [address] or Organizations: Not on file    Attends Club or Organization Meetings: Not on file    Marital Status: Not on file   Intimate Partner Violence:     Fear of Current or Ex-Partner: Not on file    Emotionally Abused: Not on file    Physically Abused: Not on file    Sexually Abused: Not on file   Housing Stability:     Unable to Pay for Housing in the Last Year: Not on file    Number of [place] in the Last Year: Not on file    Unstable Housing in the Last Year: Not on file     No current facility-administered medications for this encounter.      Current Outpatient Medications   Medication Sig Dispense Refill    dicyclomine (BENTYL) 20 MG tablet Take 1 tablet by mouth 4 times daily as needed (diarrhea) 20 tablet 0     No Known Allergies    Nursing Notes Reviewed    Physical Exam:  Triage VS:    ED Triage Vitals   Enc Vitals Group      BP 03/21/22 1007 121/77      Pulse 03/21/22 1007 109      Resp 03/21/22 1007 18      Temp 03/21/22 1007 98 °F (36.7 °C)      Temp Source 03/21/22 1007 Oral      SpO2 03/21/22 1007 97 %      Weight 03/21/22 1025 170 lb (77.1 kg)      Height --       Head Circumference --       Peak Flow --       Pain Score --       Pain Loc --       Pain Edu? --       Excl. in [address]? --        My pulse ox interpretation is - normal    General appearance:  No acute distress. Skin:  Warm. Dry. Eye:  Extraocular movements intact. Ears, nose, mouth and throat:  Oral mucosa moist   Neck:  Trachea midline. Extremity:  No swelling. Normal ROM     Heart:  Regular rate and rhythm, normal S1 & S2, no extra heart sounds. Perfusion:  intact  Respiratory:  Lungs clear to auscultation bilaterally. Respirations nonlabored. Abdominal:  Normal bowel sounds. Soft. Mild diffuse tenderness palpation lower abdomen without localization, no rebound or guarding. Non distended. Back:  No CVA tenderness to palpation     Neurological:  Alert and oriented times 3. No focal neuro deficits.              Psychiatric:  Appropriate    I have reviewed and interpreted all of the currently available lab results from this visit (if applicable):  Results for orders placed or performed during the hospital encounter of 03/21/22   CBC with Auto Differential   Result Value Ref Range    WBC 11.7 (H) 4.0 - 10.5 K/CU MM    RBC 4.77 4.6 - 6.2 M/CU MM    Hemoglobin 14.4 13.5 - 18.0 GM/DL    Hematocrit 44.5 42 - 52 %    MCV 93.3 78 - 100 FL    MCH 30.2 27 - 31 PG    MCHC 32.4 32.0 - 36.0 %    RDW 14.4 11.7 - 14.9 %    Platelets 640 909 - 915 K/CU MM    MPV 9.4 7.5 - 11.1 FL    Differential Type AUTOMATED DIFFERENTIAL     Segs Relative 58.4 36 - 66 %    Lymphocytes % 27.8 24 - 44 %    Monocytes % 8.7 (H) 0 - 4 %    Eosinophils % 2.0 0 - 3 %    Basophils % 1.0 0 - 1 %    Segs Absolute 6.8 K/CU MM    Lymphocytes Absolute 3.2 K/CU MM    Monocytes Absolute 1.0 K/CU MM    Eosinophils Absolute 0.2 K/CU MM    Basophils Absolute 0.1 K/CU MM    Nucleated RBC % 0.0 %    Total Nucleated RBC 0.0 K/CU MM    Total Immature Neutrophil 0.24 K/CU MM    Immature Neutrophil % 2.1 (H) 0 - 0.43 %   Comprehensive Metabolic Panel   Result Value Ref Range    Sodium 135 135 - 145 MMOL/L    Potassium 4.6 3.5 - 5.1 MMOL/L    Chloride 99 99 - 110 mMol/L    CO2 25 21 - 32 MMOL/L    BUN 20 6 - 23 MG/DL    CREATININE 0.8 (L) 0.9 - 1.3 MG/DL    Glucose 142 (H) 70 - 99 MG/DL    Calcium 9.1 8.3 - 10.6 MG/DL    Albumin 3.4 3.4 - 5.0 GM/DL    Total Protein 6.7 6.4 - 8.2 GM/DL    Total Bilirubin 0.1 0.0 - 1.0 MG/DL    ALT 94 (H) 10 - 40 U/L    AST 65 (H) 15 - 37 IU/L    Alkaline Phosphatase 61 40 - 129 IU/L    GFR Non-African American >60 >60 mL/min/1.73m2    GFR African American >60 >60 mL/min/1.73m2    Anion Gap 11 4 - 16   Urinalysis   Result Value Ref Range    Color, UA YELLOW YELLOW    Clarity, UA CLEAR CLEAR    Glucose, Urine NEGATIVE NEGATIVE MG/DL    Bilirubin Urine NEGATIVE NEGATIVE MG/DL    Ketones, Urine NEGATIVE NEGATIVE MG/DL    Specific Gravity, UA 1.020 1.001 - 1.035    Blood, Urine NEGATIVE NEGATIVE    pH, Urine 6.0 5.0 - 8.0    Protein, UA NEGATIVE NEGATIVE MG/DL    Urobilinogen, Urine 0.2 0.2 - 1.0 MG/DL    Nitrite Urine, Quantitative NEGATIVE NEGATIVE    Leukocyte Esterase, Urine NEGATIVE NEGATIVE    RBC, UA NONE SEEN 0 - 3 /HPF    WBC, UA <1 0 - 2 /HPF    Bacteria, UA NEGATIVE NEGATIVE /HPF   Lipase   Result Value Ref Range    Lipase 32 13 - 60 IU/L      Radiographs (if obtained):  Radiologist's Report Reviewed:  No results found. EKG (if obtained): (All EKG's are interpreted by myself in the absence of a cardiologist)      MDM:  Patient is a 79-year-old male who presents with complaints of diffuse lower abdominal pain.   Upon arrival he is noted to be hemodynamically stable though mildly tachycardic. Upon my evaluation tachycardia has resolved. He is nontoxic. Patient was given Bentyl and had improvement of his symptoms. He was able to tolerate oral intake the emergency part without difficulty. CT scan does reveal large stool burden. Labs otherwise reassuring. Mild leukocytosis of 11.7. CT scan did show some inflammation of the lungs. Patient states he recently had respiratory illness however it has resolved and he denies any shortness of breath or cough now. Patient will be given prescription for Bentyl and discharged home. He is in agreement with this disposition. Clinical Impression:  1. Generalized abdominal pain    2. Constipation, unspecified constipation type      Disposition referral (if applicable):  Pastor Miller MD  [address] [place] [address]  [phone]    Call in 1 day      Disposition medications (if applicable):  New Prescriptions    DICYCLOMINE (BENTYL) 20 MG TABLET    Take 1 tablet by mouth 4 times daily as needed (diarrhea)     ED Provider Disposition Time  DISPOSITION Decision To Discharge 03/21/2022 01:46:17 PM      Comment: Please note this report has been produced using speech recognition software and may contain errors related to that system including errors in grammar, punctuation, and spelling, as well as words and phrases that may be inappropriate. Efforts were made to edit the dictations.         Amos Lesches, MD  03/21/22 MD Livia  03/21/22 2121

## 2022-03-21 NOTE — ED PROVIDER NOTES
As physician assistant-in-triage, I performed a medical screening history and physical exam on this patient. HISTORY OF PRESENT ILLNESS  Diana Ramos is a 31 y.o. male who presents with 1 to 2 months of generalized cramping abdominal pain worse on the left side of his abdomen, diarrhea, hemorrhoids, and bloody stools. Patient reports that symptoms have been worsening. Patient reports that he recently got clean from using methamphetamines and has an upcoming appointment with PCP, Dr. Dipak Espitia, but did not feel like he could wait until then. Patient denies fever, chills, chest pain, shortness of breath, nausea, vomiting, constipation. PHYSICAL EXAM  /77   Pulse 109   Temp 98 °F (36.7 °C) (Oral)   Resp 18   Wt 170 lb (77.1 kg)   SpO2 97%   BMI 23.71 kg/m²     On exam, the patient appears well-hydrated, well-nourished, and in no acute distress. Mucous membranes are moist. Speech is clear. Breathing is unlabored. Skin is dry. Mental status is normal. Moves all extremities, and is without facial droop. Abdominal exam without peritoneal signs. PLAN:  Diagnostics initiated as needed. Please see the full history, physical exam, and final disposition note by the other provider in the main emergency department. Comment: Please note this report has been produced using speech recognition software and may contain errors related to that system including errors in grammar, punctuation, and spelling, as well as words and phrases that may be inappropriate. If there are any questions or concerns please feel free to contact the dictating provider for clarification.         Ish Melgar PA-C  03/21/22 0452

## 2022-03-24 ENCOUNTER — HOSPITAL ENCOUNTER (EMERGENCY)
Age: 27
Discharge: HOME | End: 2022-03-24
Attending: EMERGENCY MEDICINE
Payer: MEDICAID

## 2022-03-24 ENCOUNTER — OFFICE VISIT (OUTPATIENT)
Dept: FAMILY MEDICINE CLINIC | Age: 27
End: 2022-03-24
Payer: MEDICAID

## 2022-03-24 VITALS
BODY MASS INDEX: 22.21 KG/M2 | WEIGHT: 164 LBS | OXYGEN SATURATION: 98 % | HEART RATE: 88 BPM | DIASTOLIC BLOOD PRESSURE: 72 MMHG | TEMPERATURE: 97.2 F | RESPIRATION RATE: 16 BRPM | HEIGHT: 72 IN | SYSTOLIC BLOOD PRESSURE: 112 MMHG

## 2022-03-24 VITALS
BODY MASS INDEX: 23.03 KG/M2 | TEMPERATURE: 98 F | HEART RATE: 98 BPM | SYSTOLIC BLOOD PRESSURE: 124 MMHG | WEIGHT: 170 LBS | RESPIRATION RATE: 14 BRPM | HEIGHT: 72 IN | OXYGEN SATURATION: 97 % | DIASTOLIC BLOOD PRESSURE: 55 MMHG

## 2022-03-24 DIAGNOSIS — F41.9 ANXIETY: ICD-10-CM

## 2022-03-24 DIAGNOSIS — K59.09 OTHER CONSTIPATION: ICD-10-CM

## 2022-03-24 DIAGNOSIS — R10.30 LOWER ABDOMINAL PAIN: ICD-10-CM

## 2022-03-24 DIAGNOSIS — K64.9 HEMORRHOIDS, UNSPECIFIED HEMORRHOID TYPE: ICD-10-CM

## 2022-03-24 DIAGNOSIS — Z76.89 ENCOUNTER TO ESTABLISH CARE: Primary | ICD-10-CM

## 2022-03-24 DIAGNOSIS — K62.5 RECTAL BLEEDING: Primary | ICD-10-CM

## 2022-03-24 DIAGNOSIS — K92.1 BLOOD IN STOOL: ICD-10-CM

## 2022-03-24 PROCEDURE — 99203 OFFICE O/P NEW LOW 30 MIN: CPT | Performed by: NURSE PRACTITIONER

## 2022-03-24 PROCEDURE — 99285 EMERGENCY DEPT VISIT HI MDM: CPT

## 2022-03-24 RX ORDER — DOCUSATE SODIUM 100 MG/1
100 CAPSULE, LIQUID FILLED ORAL 2 TIMES DAILY
Qty: 60 CAPSULE | Refills: 0 | Status: SHIPPED | OUTPATIENT
Start: 2022-03-24 | End: 2022-04-23

## 2022-03-24 RX ORDER — DIAPER,BRIEF,INFANT-TODD,DISP
EACH MISCELLANEOUS
Qty: 30 G | Refills: 0 | Status: SHIPPED | OUTPATIENT
Start: 2022-03-24 | End: 2022-03-31

## 2022-03-24 RX ORDER — POLYETHYLENE GLYCOL 3350 17 G/17G
17 POWDER, FOR SOLUTION ORAL DAILY
Qty: 1530 G | Refills: 1 | Status: SHIPPED | OUTPATIENT
Start: 2022-03-24 | End: 2022-04-23

## 2022-03-24 ASSESSMENT — ENCOUNTER SYMPTOMS
RECTAL PAIN: 1
COUGH: 0
SHORTNESS OF BREATH: 0
CONSTIPATION: 1
ABDOMINAL DISTENTION: 0
ABDOMINAL PAIN: 1
DIARRHEA: 1
BLOOD IN STOOL: 1
CHEST TIGHTNESS: 0
WHEEZING: 0

## 2022-03-24 ASSESSMENT — COLUMBIA-SUICIDE SEVERITY RATING SCALE - C-SSRS
1. WITHIN THE PAST MONTH, HAVE YOU WISHED YOU WERE DEAD OR WISHED YOU COULD GO TO SLEEP AND NOT WAKE UP?: NO
6. HAVE YOU EVER DONE ANYTHING, STARTED TO DO ANYTHING, OR PREPARED TO DO ANYTHING TO END YOUR LIFE?: NO
2. HAVE YOU ACTUALLY HAD ANY THOUGHTS OF KILLING YOURSELF?: NO

## 2022-03-24 ASSESSMENT — PATIENT HEALTH QUESTIONNAIRE - PHQ9
2. FEELING DOWN, DEPRESSED OR HOPELESS: 2
9. THOUGHTS THAT YOU WOULD BE BETTER OFF DEAD, OR OF HURTING YOURSELF: 0
6. FEELING BAD ABOUT YOURSELF - OR THAT YOU ARE A FAILURE OR HAVE LET YOURSELF OR YOUR FAMILY DOWN: 3
SUM OF ALL RESPONSES TO PHQ9 QUESTIONS 1 & 2: 5
5. POOR APPETITE OR OVEREATING: 3
SUM OF ALL RESPONSES TO PHQ QUESTIONS 1-9: 23
7. TROUBLE CONCENTRATING ON THINGS, SUCH AS READING THE NEWSPAPER OR WATCHING TELEVISION: 3
8. MOVING OR SPEAKING SO SLOWLY THAT OTHER PEOPLE COULD HAVE NOTICED. OR THE OPPOSITE, BEING SO FIGETY OR RESTLESS THAT YOU HAVE BEEN MOVING AROUND A LOT MORE THAN USUAL: 3
1. LITTLE INTEREST OR PLEASURE IN DOING THINGS: 3
4. FEELING TIRED OR HAVING LITTLE ENERGY: 3
SUM OF ALL RESPONSES TO PHQ QUESTIONS 1-9: 23
3. TROUBLE FALLING OR STAYING ASLEEP: 3
SUM OF ALL RESPONSES TO PHQ QUESTIONS 1-9: 23
10. IF YOU CHECKED OFF ANY PROBLEMS, HOW DIFFICULT HAVE THESE PROBLEMS MADE IT FOR YOU TO DO YOUR WORK, TAKE CARE OF THINGS AT HOME, OR GET ALONG WITH OTHER PEOPLE: 2
SUM OF ALL RESPONSES TO PHQ QUESTIONS 1-9: 23

## 2022-03-24 NOTE — PROGRESS NOTES
Patrick Ladd  1995  31 y.o. 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 Also has had blood in stool (red)        Armin Rivas is a 31year old male who in to establish care. He is a resident at [facility]. He states he went to the ED 2 days ago for abdominal pain. He had a CT scan and had a large amount of stool. He states he took laxative (Senokot) from [facility] which he had a large movement of liquid stool yesterday. He states his abdominal discomfort is a little better but still feels a lot of pressure and feels he is not totally empty. He states he blood drawn yesterday for hepatitis and HIV. There are no results at this time. He states he has had problems with hemorrhoids for some time. He states when he was using drugs and marijuana this helped his pain. He states he was in prison for a few months. He admits to having anal sex and is also sexually active with women. Past Medical Hx  He states while using drugs he would have skin breakouts but this has decreased since being off them. He denies alcohol use. He states he smokes one pack of cigarettes per week. Surgical Hx  He reports having an umbilical hernia repair with mesh placed about 3 years ago. He is concerned that he may have another hernia because of the discomfort he has been having in his lower abdomen. Family Hx  Diabetes in grandmother          Current Outpatient Medications on File Prior to Visit   Medication Sig Dispense Refill    dicyclomine (BENTYL) 20 MG tablet Take 1 tablet by mouth 4 times daily as needed (diarrhea) 20 tablet 0     No current facility-administered medications on file prior to visit.        Past Medical History:   Diagnosis Date    Anxiety     Bipolar disorder ([facility] [address]     Depression      Past Surgical History:   Procedure Laterality Date    HERNIA REPAIR      TESTICLE SURGERY       Family History   Problem Relation Age of Onset    Diabetes Maternal Grandmother      Social History     Socioeconomic History    Marital status: Single     Spouse name: Not on file    Number of children: Not on file    Years of education: Not on file    Highest education level: Not on file   Occupational History    Not on file   Tobacco Use    Smoking status: Current Every Day Smoker     Packs/day: 0.25     Types: Cigarettes    Smokeless tobacco: Never Used    Tobacco comment: 2-3 per day   Vaping Use    Vaping Use: Never used   Substance and Sexual Activity    Alcohol use: Not Currently     Comment: 12 pack a day    Drug use: Not Currently     Types: Marijuana (Lynita Puna), Methamphetamines (Crystal Meth)     Comment: ice- Last used 3/9/2022    Sexual activity: Not on file   Other Topics Concern    Not on file   Social History Narrative    Not on file     Social Determinants of Health     Financial Resource Strain:     Difficulty of Paying Living Expenses: Not on file   Food Insecurity:     Worried About Running Out of Food in the Last Year: Not on file    Zuleyma of Food in the Last Year: Not on file   Transportation Needs:     Lack of Transportation (Medical): Not on file    Lack of Transportation (Non-Medical):  Not on file   Physical Activity:     Days of Exercise per Week: Not on file    Minutes of Exercise per Session: Not on file   Stress:     Feeling of Stress : Not on file   Social Connections:     Frequency of Communication with Friends and Family: Not on file    Frequency of Social Gatherings with Friends and Family: Not on file    Attends Jain Services: Not on file    Active Member of Clubs or Organizations: Not on file    Attends Club or Organization Meetings: Not on file    Marital Status: Not on file   Intimate Partner Violence:     Fear of Current or Ex-Partner: Not on file    Emotionally Abused: Not on file    Physically Abused: Not on file    Sexually Abused: Not on file   Housing Stability:     Unable to Pay for Housing in the Last Year: Not on file    Number of Places Lived in the Last Year: Not on file    Unstable Housing in the Last Year: Not on file       Review of Systems   Constitutional: Negative for activity change, appetite change, chills, diaphoresis, fatigue, fever and unexpected weight change. Respiratory: Negative for cough, chest tightness, shortness of breath and wheezing. Cardiovascular: Negative for chest pain and palpitations. Gastrointestinal: Positive for abdominal pain, blood in stool, constipation, diarrhea and rectal pain. Negative for abdominal distention. Musculoskeletal: Negative. Skin: Negative. Neurological: Negative. Psychiatric/Behavioral: Positive for dysphoric mood. The patient is nervous/anxious. OBJECTIVE:     /72   Pulse 88   Temp 97.2 °F (36.2 °C) (Temporal)   Resp 16   Ht 6' (1.829 m)   Wt 164 lb (74.4 kg)   SpO2 98%   BMI 22.24 kg/m²     Physical Exam  Vitals reviewed. Constitutional:       General: He is not in acute distress. Appearance: Normal appearance. He is well-developed. He is not ill-appearing or diaphoretic. HENT:      Head: Normocephalic and atraumatic. Right Ear: External ear normal.      Left Ear: External ear normal.      Nose: Nose normal.   Eyes:      General: No scleral icterus. Conjunctiva/sclera: Conjunctivae normal.      Pupils: Pupils are equal, round, and reactive to light. Cardiovascular:      Rate and Rhythm: Normal rate and regular rhythm. Heart sounds: Normal heart sounds. No murmur heard. No friction rub. No gallop. Pulmonary:      Effort: Pulmonary effort is normal. No respiratory distress. Breath sounds: Normal breath sounds. No wheezing. Chest:      Chest wall: No tenderness. Abdominal:      General: Abdomen is flat. Bowel sounds are normal. There is no distension.       Palpations: Abdomen is soft. Tenderness: There is no abdominal tenderness. There is no left CVA tenderness or guarding. Hernia: No hernia is present. Musculoskeletal:         General: Normal range of motion. Cervical back: Normal range of motion and neck supple. Right lower leg: No edema. Left lower leg: No edema. Skin:     General: Skin is warm and dry. Neurological:      Mental Status: He is alert and oriented to person, place, and time. Psychiatric:         Behavior: Behavior normal.         Thought Content:  Thought content normal.         Judgment: Judgment normal.         Results in Past 30 Days  Result Component Current Result Ref Range Previous Result Ref Range   Albumin 3.4 (3/21/2022) 3.4 - 5.0 GM/DL Not in Time Range    Alkaline Phosphatase 61 (3/21/2022) 40 - 129 IU/L Not in Time Range    ALT 94 (H) (3/21/2022) 10 - 40 U/L Not in Time Range    AST 65 (H) (3/21/2022) 15 - 37 IU/L Not in Time Range    BUN 20 (3/21/2022) 6 - 23 MG/DL Not in Time Range    Calcium 9.1 (3/21/2022) 8.3 - 10.6 MG/DL Not in Time Range    Chloride 99 (3/21/2022) 99 - 110 mMol/L Not in Time Range    CO2 25 (3/21/2022) 21 - 32 MMOL/L Not in Time Range    CREATININE 0.8 (L) (3/21/2022) 0.9 - 1.3 MG/DL Not in Time Range    GFR  >60 (3/21/2022) >60 mL/min/1.73m2 Not in Time Range    GFR Non- >60 (3/21/2022) >60 mL/min/1.73m2 Not in Time Range    Glucose 142 (H) (3/21/2022) 70 - 99 MG/DL Not in Time Range    Potassium 4.6 (3/21/2022) 3.5 - 5.1 MMOL/L Not in Time Range    Sodium 135 (3/21/2022) 135 - 145 MMOL/L Not in Time Range    Total Bilirubin 0.1 (3/21/2022) 0.0 - 1.0 MG/DL Not in Time Range    Total Protein 6.7 (3/21/2022) 6.4 - 8.2 GM/DL Not in Time Range      No results found for: LABA1C, LABMICR, LDLCALC    Lab Results   Component Value Date    WBC 11.7 03/21/2022    WBC 5.0 02/08/2022    WBC 6.0 02/05/2022    HGB 14.4 03/21/2022    HGB 14.8 02/08/2022    HGB 15.0 02/05/2022 HCT 44.5 03/21/2022    HCT 42.4 02/08/2022    HCT 43.9 02/05/2022    MCV 93.3 03/21/2022    MCV 90.4 02/08/2022    MCV 90.7 02/05/2022     03/21/2022     02/08/2022     02/05/2022    SEGSABS 6.8 03/21/2022    SEGSABS 2.8 02/08/2022    SEGSABS 2.9 02/05/2022    LYMPHSABS 3.2 03/21/2022    MONOSABS 1.0 03/21/2022    EOSABS 0.2 03/21/2022    BASOSABS 0.1 03/21/2022     Lab Results   Component Value Date    TSHHS 1.430 04/28/2019     Lab Results   Component Value Date    LABALBU 3.4 03/21/2022    BILITOT 0.1 03/21/2022    BILIDIR 0.2 05/03/2019    IBILI 0.0 05/03/2019    AST 65 03/21/2022    ALT 94 03/21/2022    ALKPHOS 61 03/21/2022             No results found for this visit on 03/24/22. ASSESSMENT AND PLAN:     1. Encounter to establish care    2. Other constipation  - docusate sodium (COLACE) 100 MG capsule; Take 1 capsule by mouth 2 times daily  Dispense: 60 capsule; Refill: 0  - polyethylene glycol (GLYCOLAX) 17 GM/SCOOP powder; Take 17 g by mouth daily  Dispense: 1530 g; Refill: [address]  Cesilia Garcia, CNP, [facility]    3. Anxiety    4. Blood in stool  - [place], CNP, Gastroenterology, [facility]    5. Hemorrhoids, unspecified hemorrhoid type  - witch hazel-glycerin (TUCKS) pad; Place rectally as needed. Dispense: 100 each; Refill: 2    6. Lower abdominal pain  - [facility] Cesilia Garcia, [place]    Use prescribed medications as directed  Continue program with Ross Huizar  Referred to GI   Flu shot offered - declines at this time  Awaiting blood work drawn at the health department  Verbalized understanding and agreement with plan      Return in about 3 months (around 6/24/2022) for Medication Re-Evaluation; blood in stool. Care discussed with patient. Patient educated on signs and symptoms of exacerbation and when to seek further medical attention. Advised to call for any problems, questions, or concerns. Patient verbalizes understanding and agrees with plan. Medications reviewed and reconciled. Continue current medications. Appropriate prescriptions are ordered. Risks and benefits of meds are discussed. After visit summary provided.

## 2022-03-25 ENCOUNTER — TELEPHONE (OUTPATIENT)
Dept: GASTROENTEROLOGY | Age: 27
End: 2022-03-25

## 2022-03-25 NOTE — ED NOTES
Patient went to the restroom and states he believes that his hemorrhoids are bleeding. Also states he is a hypochondriac and thinks he is making more of it than it is.       Zoila Delgado RN  03/24/22 [id number]

## 2022-03-25 NOTE — ED PROVIDER NOTES
itching. ?  ? PAST MEDICAL HISTORY  Past Medical History:   Diagnosis Date    Anxiety     B[facility] disorder ([facility] [address]     Depression      FAMILY HISTORY  Family History   Problem Relation Age of Onset    Diabetes Maternal Grandmother      SOCIAL HISTORY  Social History     Socioeconomic History    Marital status: Single     Spouse name: None    Number of children: None    Years of education: None    Highest education level: None   Occupational History    None   Tobacco Use    Smoking status: Current Every Day Smoker     Packs/day: 0.25     Types: Cigarettes    Smokeless tobacco: Never Used    Tobacco comment: 2-3 per day   Vaping Use    Vaping Use: Never used   Substance and Sexual Activity    Alcohol use: Not Currently     Comment: 12 pack a day    Drug use: Not Currently     Types: Marijuana (Erika Osgood), Methamphetamines (Crystal Meth)     Comment: ice- Last used 3/9/2022    Sexual activity: None   Other Topics Concern    None   Social History Narrative    None     Social Determinants of Health     Financial Resource Strain:     Difficulty of Paying Living Expenses: Not on file   Food Insecurity:     Worried About Running Out of Food in the Last Year: Not on file    Zuleyma of Food in the Last Year: Not on file   Transportation Needs:     Lack of Transportation (Medical): Not on file    Lack of Transportation (Non-Medical):  Not on file   Physical Activity:     Days of Exercise per Week: Not on file    Minutes of Exercise per Session: Not on file   Stress:     Feeling of Stress : Not on file   Social Connections:     Frequency of Communication with Friends and Family: Not on file    Frequency of Social Gatherings with Friends and Family: Not on file    Attends Nondenominational Services: Not on file    Active Member of Clubs or Organizations: Not on file    Attends Club or Organization Meetings: Not on file    Marital Status: Not on file   Intimate Partner Violence:     Fear of Current or Ex-Partner: Not on file    Emotionally Abused: Not on file    Physically Abused: Not on file    Sexually Abused: Not on file   Housing Stability:     Unable to Pay for Housing in the Last Year: Not on file    Number of Places Lived in the Last Year: Not on file    Unstable Housing in the Last Year: Not on file       SURGICAL HISTORY  Past Surgical History:   Procedure Laterality Date   2005 A [facility]  Discharge Medication List as of 3/24/2022 10:29 PM      CONTINUE these medications which have NOT CHANGED    Details   docusate sodium (COLACE) 100 MG capsule Take 1 capsule by mouth 2 times daily, Disp-60 capsule, R-0Normal      witch hazel-glycerin (TUCKS) pad Place rectally as needed. , Disp-100 each, R-2, Normal      polyethylene glycol (GLYCOLAX) 17 GM/SCOOP powder Take 17 g by mouth daily, Disp-1530 g, R-1Normal      dicyclomine (BENTYL) 20 MG tablet Take 1 tablet by mouth 4 times daily as needed (diarrhea), Disp-20 tablet, R-0Print           ALLERGIES  No Known Allergies    Nursing notes reviewed by myself for past medical history, family history, social history, surgical history, current medications, and allergies. PHYSICAL EXAM  VITAL SIGNS: Triage VS:    ED Triage Vitals   Enc Vitals Group      BP 03/24/22 2111 (!) 126/58      Pulse 03/24/22 2111 98      Resp 03/24/22 2111 14      Temp 03/24/22 2111 98 °F (36.7 °C)      Temp src --       SpO2 03/24/22 2111 98 %      Weight 03/24/22 2101 170 lb (77.1 kg)      Height 03/24/22 2101 6' (1.829 m)      Head Circumference --       Peak Flow --       Pain Score --       Pain Loc --       Pain Edu? --       Excl. in [address]? --      Constitutional: Well developed, Well nourished, non-toxic appearing  HENT: Normocephalic, Atraumatic, Bilateral external ears normal, Nose normal.   Eyes: Conjunctiva normal, No discharge. No scleral icterus. Neck: Normal range of motion, No tenderness, Supple.   Lymphatic: No lymphadenopathy noted. Cardiovascular: Normal heart rate, Normal rhythm, No murmurs, gallops or rubs. Thorax & Lungs: Normal breath sounds, No respiratory distress, No wheezing. Abdomen: Soft, No tenderness, No masses, No pulsatile masses, No distention, Normal bowel sounds  Rectal: Non-thrombosed external hemorrhoid at the 3'o'clock position with some bright red blood per rectum. No anal fissures present  Skin: Warm, Dry, Pink, No mottling, No erythema, No rash. Neurologic: Alert & oriented x 3, No focal deficits noted. Psychiatric: Anxious, cooperative    EKG  NA  RADIOLOGY  Labs Reviewed - No data to display  I personally reviewed the images. The radiologist's interpretation reveals:  Last Imaging results   No orders to display       MEDS GIVEN IN ED:  Medications - No data to display  [facility]    70-year-old male presents emergency department concerns of rectal bleeding and some rectal pain this evening. Initial vital signs without tachycardia or hypotension. On exam his abdomen is soft and nontender. He does have some bright red blood per rectum and a nonthrombosed external hemorrhoid at the 3 o'clock position. At this time given his reassuring evaluation I feel patient appropriate discharge home with follow-up by gastroenterology as well as general surgery. He has his resources provided to him. He has a prescription for Colace as well as MiraLAX and Tucks pads at home. I will add on a prescription for Preparation H. Discharged with strict return precautions      Amount and/or Complexity of Data Reviewed  Clinical lab tests: reviewed  Decide to obtain previous medical records or to obtain history from someone other than the patient: yes       -  Patient seen and evaluated in the emergency department. -  Triage and nursing notes reviewed and incorporated. -  Old chart records reviewed and incorporated.   -  Work-up included:  See above  -  Results discussed with patient. Appropriate PPE utilized as indicated for entire patient encounter? Time of Disposition: See timeline  ? Discharge Medication List as of 3/24/2022 10:29 PM      START taking these medications    Details   hydrocortisone (ALA-LORIN) 1 % cream Apply topically 2 times daily. , Disp-30 g, R-0, Normal           FINAL IMPRESSION  1. Rectal bleeding    2. Hemorrhoids, unspecified hemorrhoid type        Electronically signed by:  [address], DO, 3/25/2022         [address], DO  03/25/22 [id number]

## 2022-03-25 NOTE — ED TRIAGE NOTES
Pt reports going to the BR this evening and states BM was all blood and a large amount. Denies having any stool in this. Denies pain but states is freaking out. Pt does report leakage at this time. Maria Esther Shillings with pt reports him currently being in drug treatment and to avoid narcs in care if possible.

## 2022-04-01 ENCOUNTER — TELEPHONE (OUTPATIENT)
Dept: GASTROENTEROLOGY | Age: 27
End: 2022-04-01

## 2022-04-01 NOTE — TELEPHONE ENCOUNTER
Called [employer] and spoke with someone in residential and  for her to inform pt. To call us to make an appt from a referral for abd pain, constipation and bood in stool.

## 2022-04-14 ENCOUNTER — TELEPHONE (OUTPATIENT)
Dept: FAMILY MEDICINE CLINIC | Age: 27
End: 2022-04-14

## 2022-04-14 NOTE — TELEPHONE ENCOUNTER
When patient was seen it was to establish care and follow up from his ED visit. If a patient is already on medication for ADHD I can continue to manage but after the patient completes his program. There was no discussion of ADHD. If he needs this he would go through the mental health prescriber for [facility].

## 2022-04-14 NOTE — TELEPHONE ENCOUNTER
Rajeev Mcintyre  from [employer] and requesting if you can prescribe ADHD meds or evaluate Tommy for ADHD. I asked him about mental health prescribing something and they said that there has been a issue with them.

## 2022-04-15 NOTE — TELEPHONE ENCOUNTER
Spoke with Halie Leonardo the , he understood that Chato Smith will not start the medication.  I guess there was a miscommunication between the mental health provider and Sindhu Mcelroy

## 2022-04-19 ENCOUNTER — OFFICE VISIT (OUTPATIENT)
Dept: GASTROENTEROLOGY | Age: 27
End: 2022-04-19
Payer: MEDICAID

## 2022-04-19 VITALS
DIASTOLIC BLOOD PRESSURE: 76 MMHG | TEMPERATURE: 97.3 F | HEART RATE: 91 BPM | WEIGHT: 173.8 LBS | SYSTOLIC BLOOD PRESSURE: 104 MMHG | OXYGEN SATURATION: 99 % | BODY MASS INDEX: 23.54 KG/M2 | HEIGHT: 72 IN

## 2022-04-19 DIAGNOSIS — K92.1 BLOOD IN STOOL: Primary | ICD-10-CM

## 2022-04-19 DIAGNOSIS — R93.89 ABNORMAL FINDING ON CT SCAN: ICD-10-CM

## 2022-04-19 DIAGNOSIS — R10.30 LOWER ABDOMINAL PAIN: ICD-10-CM

## 2022-04-19 PROCEDURE — 99203 OFFICE O/P NEW LOW 30 MIN: CPT | Performed by: NURSE PRACTITIONER

## 2022-04-19 ASSESSMENT — ENCOUNTER SYMPTOMS
EYE DISCHARGE: 0
VOMITING: 0
EYE PAIN: 0
WHEEZING: 0
BACK PAIN: 0
COUGH: 0
NAUSEA: 0
ABDOMINAL PAIN: 0
DIARRHEA: 0
SHORTNESS OF BREATH: 0
CONSTIPATION: 0
COLOR CHANGE: 0

## 2022-04-19 NOTE — PATIENT INSTRUCTIONS
Patient Education        Hemorrhoids: Care Instructions  Overview     Hemorrhoids are swollen veins that develop in the anal canal. Bleeding during bowel movements, itching, and rectal pain are the most common symptoms. Hemorrhoids can be uncomfortable at times, but rarely are they a seriousproblem. Most of the time, you can treat them with simple changes to your diet and bowel habits. These changes include eating more fiber and not straining to pass stools. Most hemorrhoids don't need surgery or other treatment unless they arevery large and painful or bleed a lot. Follow-up care is a key part of your treatment and safety. Be sure to make and go to all appointments, and call your doctor if you are having problems. It's also a good idea to know your test results and keep alist of the medicines you take. How can you care for yourself at home?  Sit in a few inches of warm water (sitz bath) 3 times a day and after bowel movements. The warm water helps with pain and itching.  Put ice on your anal area several times a day for 10 minutes at a time. Put a thin cloth between the ice and your skin. Follow this by placing a warm, wet towel on the area for another 10 to 20 minutes.  Take pain medicines exactly as directed. ? If the doctor gave you a prescription medicine for pain, take it as prescribed. ? If you are not taking a prescription pain medicine, ask your doctor if you can take an over-the-counter medicine.  Keep the anal area clean, but be gentle. Use water and a fragrance-free soap, or use baby wipes or medicated pads such as Tucks.  Wear cotton underwear and loose clothing to decrease moisture in the anal area.  Eat more fiber. Include foods such as whole-grain breads and cereals, raw vegetables, raw and dried fruits, and beans.  Drink plenty of fluids.  If you have kidney, heart, or liver disease and have to limit fluids, talk with your doctor before you increase the amount of fluids you drink.   Use a stool softener that contains bran or psyllium. You can save money by buying bran or psyllium (available in bulk at most health food stores) and sprinkling it on foods or stirring it into fruit juice. Or you can use a product such as Metamucil or Hydrocil.  Practice healthy bowel habits. ? Go to the bathroom as soon as you have the urge. ? Avoid straining to pass stools. Relax and give yourself time to let things happen naturally. ? Do not hold your breath while passing stools. ? Do not read while sitting on the toilet. Get off the toilet as soon as you have finished.  Take your medicines exactly as prescribed. Call your doctor if you think you are having a problem with your medicine. When should you call for help? Call 911 anytime you think you may need emergency care. For example, call if:     You pass maroon or very bloody stools. Call your doctor now or seek immediate medical care if:     You have increased pain.      You have increased bleeding. Watch closely for changes in your health, and be sure to contact your doctor if:     Your symptoms have not improved after 3 or 4 days. Where can you learn more? Go to https://[employer]. org and sign in to your "[employer] account. Enter W840 in the [place] box to learn more about \"Hemorrhoids: Care Instructions. \"     If you do not have an account, please click on the \"Sign Up Now\" link. Current as of: September 8, 2021               Content Version: 13.2  © 2006-2022 [employer]. Care instructions adapted under license by [facility] ([facility]). If you have questions about a medical condition or this instruction, always ask your healthcare professional. [place] [address] any warranty or liability for your use of this information.

## 2022-04-19 NOTE — PROGRESS NOTES
Tremayne Agustin 31 y.o. male was seen by AMMON Anaya on 4/19/2022     Wt Readings from Last 3 Encounters:   04/19/22 173 lb 12.8 oz (78.8 kg)   03/24/22 170 lb (77.1 kg)   03/24/22 164 lb (74.4 kg)       CARLIE Agustin is a pleasant 31 y.o.  male who presents today for episode of constipation and blood in stool. He has a past medical history of anxiety, bipolar disorder, depression and drug abuse. He mentioned that his mother passed away on Sunday. 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 Per patient record he had lab work done at his rehab center and he was negative for Hepatitis A, B or C. He is uncertain of his immunization status. He denies current drug or alcohol use. He mentioned being evaluated for abdominal pain at [facility].  His CT of the abdomen/pelvis done on 3- showed new infectious/inflammatory airway process at the lung bases and moderate to large volume stool throughout the colon. He mentioned at that time he was having constipation. He mentioned that his constipation has resolved. His typical bowel pattern is twice daily with soft brown formed stools. No diarrhea. He mentioned having intermittent hemorrhoidal bleeding with small amount of blood noted on tissue after wiping with last episode a couple weeks ago. He is not using tucks and denies anal irritation. No active bright red bleeding or melena. No excess belching or flatulence. His appetite is good without early satiety. His weight is stable. No nausea or vomiting. He has intermittent heartburn. No nocturnal awakenings with acid reflux. No dysphagia or pain with swallowing. No abdominal pain, bloating or distention. No family history of stomach or colon cancer. ROS  Review of Systems   Constitutional: Negative for appetite change, chills, diaphoresis, fatigue, fever and unexpected weight change.    HENT: Negative for ear pain, hearing loss and tinnitus. Eyes: Negative for pain, discharge and visual disturbance. Respiratory: Negative for cough, shortness of breath and wheezing. Cardiovascular: Negative for chest pain, palpitations and leg swelling. Gastrointestinal: Positive for blood in stool. Negative for abdominal pain, constipation, diarrhea, nausea and vomiting. Endocrine: Negative for cold intolerance and heat intolerance. Genitourinary: Negative for dysuria, frequency, hematuria and urgency. Musculoskeletal: Negative for back pain, myalgias and neck pain. Skin: Negative for color change, pallor and rash. Allergic/Immunologic: Negative for environmental allergies and food allergies. Neurological: Negative for dizziness, seizures, weakness and headaches. Hematological: Does not bruise/bleed easily. Psychiatric/Behavioral: Positive for dysphoric mood and sleep disturbance. The patient is nervous/anxious. Allergies  No Known Allergies    Medications  Current Outpatient Medications   Medication Sig Dispense Refill    docusate sodium (COLACE) 100 MG capsule Take 1 capsule by mouth 2 times daily 60 capsule 0    witch hazel-glycerin (TUCKS) pad Place rectally as needed. 100 each 2    polyethylene glycol (GLYCOLAX) 17 GM/SCOOP powder Take 17 g by mouth daily 1530 g 1    dicyclomine (BENTYL) 20 MG tablet Take 1 tablet by mouth 4 times daily as needed (diarrhea) 20 tablet 0     No current facility-administered medications for this visit. Past medical history:   He has a past medical history of Anxiety, Bipolar disorder ([address] and Depression. Past surgical history:  He has a past surgical history that includes hernia repair and Testicle surgery. Social History:  He reports that he quit smoking about 2 months ago. His smoking use included cigarettes. He smoked 0.25 packs per day. He has never used smokeless tobacco. He reports previous alcohol use. He reports previous drug use.  Drugs: Marijuana (Weed) and Methamphetamines (Crystal Meth). Family history:  His family history includes Diabetes in his maternal grandmother. Objective    Vitals:    04/19/22 1541   BP: 104/76   Pulse: 91   Temp: 97.3 °F (36.3 °C)   SpO2: 99%        Physical exam    Physical Exam  Vitals reviewed. Constitutional:       General: He is not in acute distress. Appearance: Normal appearance. He is well-developed. He is not ill-appearing, toxic-appearing or diaphoretic. HENT:      Head: Normocephalic and atraumatic. Nose: Nose normal.      Mouth/Throat:      Mouth: Mucous membranes are moist.   Eyes:      Conjunctiva/sclera: Conjunctivae normal.      Pupils: Pupils are equal, round, and reactive to light. Neck:      Thyroid: No thyromegaly. Vascular: No JVD. Trachea: No tracheal deviation. Cardiovascular:      Rate and Rhythm: Normal rate and regular rhythm. Pulses: Normal pulses. Heart sounds: Normal heart sounds. No murmur heard. No friction rub. No gallop. Pulmonary:      Effort: Pulmonary effort is normal. No respiratory distress. Breath sounds: Normal breath sounds. No stridor. No wheezing, rhonchi or rales. Chest:      Chest wall: No tenderness. Abdominal:      General: Bowel sounds are normal. There is no distension. Palpations: Abdomen is soft. There is no mass. Tenderness: There is no abdominal tenderness. There is no guarding or rebound. Hernia: No hernia is present. Musculoskeletal:         General: Normal range of motion. Cervical back: Normal range of motion and neck supple. Lymphadenopathy:      Cervical: No cervical adenopathy. Skin:     General: Skin is warm and dry. Neurological:      Mental Status: He is alert and oriented to person, place, and time.    Psychiatric:         Mood and Affect: Mood normal.         Admission on 03/21/2022, Discharged on 03/21/2022   Component Date Value Ref Range Status    WBC 03/21/2022 11.7* 4.0 - 10.5 K/CU MM Final    RBC 03/21/2022 4.77  4.6 - 6.2 M/CU MM Final    Hemoglobin 03/21/2022 14.4  13.5 - 18.0 GM/DL Final    Hematocrit 03/21/2022 44.5  42 - 52 % Final    MCV 03/21/2022 93.3  78 - 100 FL Final    MCH 03/21/2022 30.2  27 - 31 PG Final    MCHC 03/21/2022 32.4  32.0 - 36.0 % Final    RDW 03/21/2022 14.4  11.7 - 14.9 % Final    Platelets 95/32/6538 338  140 - 440 K/CU MM Final    MPV 03/21/2022 9.4  7.5 - 11.1 FL Final    Differential Type 03/21/2022 AUTOMATED DIFFERENTIAL   Final    Segs Relative 03/21/2022 58.4  36 - 66 % Final    Lymphocytes % 03/21/2022 27.8  24 - 44 % Final    Monocytes % 03/21/2022 8.7* 0 - 4 % Final    Eosinophils % 03/21/2022 2.0  0 - 3 % Final    Basophils % 03/21/2022 1.0  0 - 1 % Final    Segs Absolute 03/21/2022 6.8  K/CU MM Final    Lymphocytes Absolute 03/21/2022 3.2  K/CU MM Final    Monocytes Absolute 03/21/2022 1.0  K/CU MM Final    Eosinophils Absolute 03/21/2022 0.2  K/CU MM Final    Basophils Absolute 03/21/2022 0.1  K/CU MM Final    Nucleated RBC % 03/21/2022 0.0  % Final    Total Nucleated RBC 03/21/2022 0.0  K/CU MM Final    Total Immature Neutrophil 03/21/2022 0.24  K/CU MM Final    Immature Neutrophil % 03/21/2022 2.1* 0 - 0.43 % Final    Sodium 03/21/2022 135  135 - 145 MMOL/L Final    Potassium 03/21/2022 4.6  3.5 - 5.1 MMOL/L Final    Chloride 03/21/2022 99  99 - 110 mMol/L Final    CO2 03/21/2022 25  21 - 32 MMOL/L Final    BUN 03/21/2022 20  6 - 23 MG/DL Final    CREATININE 03/21/2022 0.8* 0.9 - 1.3 MG/DL Final    Glucose 03/21/2022 142* 70 - 99 MG/DL Final    Calcium 03/21/2022 9.1  8.3 - 10.6 MG/DL Final    Albumin 03/21/2022 3.4  3.4 - 5.0 GM/DL Final    Total Protein 03/21/2022 6.7  6.4 - 8.2 GM/DL Final    Total Bilirubin 03/21/2022 0.1  0.0 - 1.0 MG/DL Final    ALT 03/21/2022 94* 10 - 40 U/L Final    AST 03/21/2022 65* 15 - 37 IU/L Final    Alkaline Phosphatase 03/21/2022 61  40 - 129 IU/L Final    GFR Non- 03/21/2022 >60  >60 mL/min/1.73m2 Final    GFR  03/21/2022 >60  >60 mL/min/1.73m2 Final    Anion Gap 03/21/2022 11  4 - 16 Final    Color, UA 03/21/2022 YELLOW  YELLOW Final    Clarity, UA 03/21/2022 CLEAR  CLEAR Final    Glucose, Urine 03/21/2022 NEGATIVE  NEGATIVE MG/DL Final    Bilirubin Urine 03/21/2022 NEGATIVE  NEGATIVE MG/DL Final    Ketones, Urine 03/21/2022 NEGATIVE  NEGATIVE MG/DL Final    Specific Gravity, UA 03/21/2022 1.020  1.001 - 1.035 Final    Blood, Urine 03/21/2022 NEGATIVE  NEGATIVE Final    pH, Urine 03/21/2022 6.0  5.0 - 8.0 Final    Protein, UA 03/21/2022 NEGATIVE  NEGATIVE MG/DL Final    Urobilinogen, Urine 03/21/2022 0.2  0.2 - 1.0 MG/DL Final    Nitrite Urine, Quantitative 03/21/2022 NEGATIVE  NEGATIVE Final    Leukocyte Esterase, Urine 03/21/2022 NEGATIVE  NEGATIVE Final    RBC, UA 03/21/2022 NONE SEEN  0 - 3 /HPF Final    WBC, UA 03/21/2022 <1  0 - 2 /HPF Final    Bacteria, UA 03/21/2022 NEGATIVE  NEGATIVE /HPF Final    Lipase 03/21/2022 32  13 - 60 IU/L Final       Assessment and Plan:  1. Episode of abdominal pain most likely from episode of constipation. His abdominal pain and constipation has since resolved. He mentioned no constipation at this time. His bowel pattern has normalized. The patient was encouraged to maintain a good bowel regimen with increase in fruit, fiber and fluids. The patient was provided with information on high fiber diet. 2.  Blood in stool most likely from hemorrhoids. His lab work showed no evidence of anemia. The patient was offered a colonoscopy and he declined at this time and he was encouraged to return to clinic if any further episodes of rectal bleeding and he verbalized understanding. He denies melena. The patient was instructed to avoid straining with bowel movements and avoid sitting on the toilet for long periods of time. The patient was provided with information on hemorrhoids.   3. Abnormal finding on CT. His CT of the abdomen/pelvis done on 3- showed new infectious/inflammatory airway process at the lung bases and moderate to large volume stool throughout the colon. Recommend smoking cessation and further work up with PCP/pulmonologist and he verbalized understanding. 4.  The patient was encouraged to follow-up as needed. Total time:  35 minutes.

## 2022-04-21 ASSESSMENT — ENCOUNTER SYMPTOMS: BLOOD IN STOOL: 1
